# Patient Record
Sex: FEMALE | HISPANIC OR LATINO | Employment: OTHER | URBAN - METROPOLITAN AREA
[De-identification: names, ages, dates, MRNs, and addresses within clinical notes are randomized per-mention and may not be internally consistent; named-entity substitution may affect disease eponyms.]

---

## 2017-02-25 ENCOUNTER — APPOINTMENT (OUTPATIENT)
Dept: LAB | Facility: MEDICAL CENTER | Age: 47
End: 2017-02-25
Payer: COMMERCIAL

## 2017-03-18 ENCOUNTER — HOSPITAL ENCOUNTER (OUTPATIENT)
Dept: LAB | Facility: MEDICAL CENTER | Age: 47
End: 2017-03-18
Attending: INTERNAL MEDICINE
Payer: COMMERCIAL

## 2017-03-18 PROCEDURE — 83013 H PYLORI (C-13) BREATH: CPT

## 2017-03-21 LAB — UREA BREATH TEST QL: NEGATIVE

## 2017-04-26 ENCOUNTER — GYNECOLOGY VISIT (OUTPATIENT)
Dept: OBGYN | Facility: CLINIC | Age: 47
End: 2017-04-26
Payer: COMMERCIAL

## 2017-04-26 ENCOUNTER — HOSPITAL ENCOUNTER (OUTPATIENT)
Facility: MEDICAL CENTER | Age: 47
End: 2017-04-26
Attending: OBSTETRICS & GYNECOLOGY
Payer: COMMERCIAL

## 2017-04-26 VITALS
DIASTOLIC BLOOD PRESSURE: 70 MMHG | BODY MASS INDEX: 32.18 KG/M2 | HEIGHT: 67 IN | SYSTOLIC BLOOD PRESSURE: 122 MMHG | WEIGHT: 205 LBS

## 2017-04-26 DIAGNOSIS — Z11.51 SPECIAL SCREENING EXAMINATION FOR HUMAN PAPILLOMAVIRUS (HPV): ICD-10-CM

## 2017-04-26 DIAGNOSIS — Z12.4 SCREENING FOR CERVICAL CANCER: ICD-10-CM

## 2017-04-26 DIAGNOSIS — N95.1 PERIMENOPAUSAL VASOMOTOR SYMPTOMS: ICD-10-CM

## 2017-04-26 DIAGNOSIS — N91.5 OLIGOMENORRHEA: ICD-10-CM

## 2017-04-26 DIAGNOSIS — Z12.39 SCREENING FOR BREAST CANCER: ICD-10-CM

## 2017-04-26 PROCEDURE — 88175 CYTOPATH C/V AUTO FLUID REDO: CPT

## 2017-04-26 PROCEDURE — 99386 PREV VISIT NEW AGE 40-64: CPT | Performed by: OBSTETRICS & GYNECOLOGY

## 2017-04-26 PROCEDURE — 87624 HPV HI-RISK TYP POOLED RSLT: CPT

## 2017-04-26 NOTE — MR AVS SNAPSHOT
"        Sangeetha Oconnor Jaxon   2017 3:00 PM   Gynecology Visit   MRN: [id number]    Department:  [facility]   Dept Phone:  [phone]    Description:  Female : 1970   Provider:  Asael Suero M.D.           Reason for Visit     New Patient           Allergies as of 2017     No Known Allergies      You were diagnosed with     Screening for cervical cancer   [001908]       Special screening examination for human papillomavirus (HPV)   [V73.81.ICD-9-CM]       Oligomenorrhea   [787223]         Vital Signs     Blood Pressure Height Weight Body Mass Index Last Menstrual Period Breastfeeding?    122/70 mmHg 1.702 m (5' 7.01\") 92.987 kg (205 lb) 32.10 kg/m2 2017 No    Smoking Status                   Never Smoker            Basic Information     Date Of Birth Sex Race Ethnicity Preferred Language    1970 Female  or   Origin (Irish,Colombian,Gibraltarian,Reji, etc) English      Health Maintenance        Date Due Completion Dates    IMM DTaP/Tdap/Td Vaccine (1 - Tdap) 1991 ---    MAMMOGRAM 2012 ---    PAP SMEAR 3/13/2018 3/13/2015            Current Immunizations     No immunizations on file.      Below and/or attached are the medications your provider expects you to take. Review all of your home medications and newly ordered medications with your provider and/or pharmacist. Follow medication instructions as directed by your provider and/or pharmacist. Please keep your medication list with you and share with your provider. Update the information when medications are discontinued, doses are changed, or new medications (including over-the-counter products) are added; and carry medication information at all times in the event of emergency situations     Allergies:  No Known Allergies          Medications  Valid as of: 2017 -  3:34 PM    Generic Name Brand Name Tablet Size Instructions for use    Meclizine HCl (Tab) ANTIVERT 25 MG Take 1 Tab by " mouth 4 times a day as needed for Dizziness.        Naproxen (Tab) NAPROSYN 500 MG Take 1 Tab by mouth 2 times a day as needed.        Omega-3 Fatty Acids (Cap) fish oil 1000 MG Take 1,000 mg by mouth 3 times a day, with meals.        Omeprazole (CAPSULE DELAYED RELEASE) PRILOSEC 40 MG Take 1 Cap by mouth every day.        Omeprazole Magnesium (CAPSULE DELAYED RELEASE) Omeprazole Magnesium 20.6 (20 BASE) MG Take  by mouth.        Progesterone Micronized (Cap) PROMETRIUM 200 MG Take 1 Cap by mouth every day.        .                 Medicines prescribed today [facility] to:     [id number] [place] [place] [facility] [address]    [address] [place] [address]    Phone: [phone] Fax: [phone]    Open 24 Hours?: No      Medication refill instructions:       If your prescription bottle indicates you have medication refills left, it is not necessary to call your provider’s office. Please contact your pharmacy and they will refill your medication.    If your prescription bottle indicates you do not have any refills left, you may request refills at any time through one of the following ways: The online [employer] system (except Urgent Care), by calling your provider’s office, or by asking your pharmacy to contact your provider’s office with a refill request. Medication refills are processed only during regular business hours and may not be available until the next business day. Your provider may request additional information or to have a follow-up visit with you prior to refilling your medication.   *Please Note: Medication refills are assigned a new Rx number when refilled electronically. Your pharmacy may indicate that no refills were authorized even though a new prescription for the same medication is available at the pharmacy. Please request the medicine by name with the pharmacy before contacting your provider for a refill.        Your To Do List     Future Labs/Procedures Complete By Expires    ESTRADIOL  As directed  4/26/2018    MA-SCREEN MAMMO W/CAD-BILAT  As directed 4/26/2018    THINPREP PAP WITH HPV  As directed 4/26/2017         [email or web address] Access Code: [email or web address]  Expires: 5/26/2017  8:21 AM    Yaolan.com  A secure, online tool to manage your health information     [employer]’s Yaolan.com® is a secure, online tool that connects you to your personalized health information from the privacy of your home -- day or night - making it very easy for you to manage your healthcare. Once the activation process is completed, you can even access your medical information using the Yaolan.com rosalina, which is available for free in the Apple Rosalina store or Google Play store.     Yaolan.com provides the following levels of access (as shown below):   My Chart Features   [facility] Doctor [facility]  Urgent  Care Non-[facility]  Primary Care  Doctor   Email your healthcare team securely and privately 24/7 X X X    Manage appointments: schedule your next appointment; view details of past/upcoming appointments X      Request prescription refills. X      View recent personal medical records, including lab and immunizations X X X X   View health record, including health history, allergies, medications X X X X   Read reports about your outpatient visits, procedures, consult and ER notes X X X X   See your discharge summary, which is a recap of your hospital and/or ER visit that includes your diagnosis, lab results, and care plan. X X       How to register for Yaolan.com:  1. Go to  [email or web address].  2. Click on the Sign Up Now box, which takes you to the New Member Sign Up page. You will need to provide the following information:  a. Enter your Yaolan.com Access Code exactly as it appears at the top of this page. (You will not need to use this code after you’ve completed the sign-up process. If you do not sign up before the expiration date, you must request a new code.)   b. Enter your date of birth.   c. Enter your home email  address.   d. Click Submit, and follow the next screen’s instructions.  3. Create a [employer] ID. This will be your [employer] login ID and cannot be changed, so think of one that is secure and easy to remember.  4. Create a [employer] password. You can change your password at any time.  5. Enter your Password Reset Question and Answer. This can be used at a later time if you forget your password.   6. Enter your e-mail address. This allows you to receive e-mail notifications when new information is available in [employer].  7. Click Sign Up. You can now view your health information.    For assistance activating your [employer] account, call [phone]

## 2017-04-26 NOTE — PROGRESS NOTES
" Sangeetha Oconnor Lopez47 y.o.  SS female presents for Annual Well Woman Exam.    ROS: Patient is feeling well. No dyspnea or chest pain on exertion. No Abdominal pain, change in bowel habits, black or bloody stools. No urinary sx. GYN ROS:no breast pain or new or enlarging lumps on self exam, she complains of irregular cycles , but only once/monthly . Positive breast symptoms:  tenderness bilaterally with cycles . No neurological complaints.  History reviewed. No pertinent past medical history.Patient reports hot flashes and decreased libido   mammogram this month   Allergy:   Review of patient's allergies indicates no known allergies.    LMP:       Patient's last menstrual period was 2017. .     Menstrual History: menses irregular: monthly , but different dates , no cycle this month   Contraceptive Method:none      Objective : The patient appears well, alert and oriented x 3, in no acute distress.  Blood pressure 122/70, height 1.702 m (5' 7.01\"), weight 92.987 kg (205 lb), last menstrual period 2017, not currently breastfeeding.  HEENT Exam: EOMI, MARY, no adenopathy or thyromegaly.  Lungs: Clear to Auscultation and Percussion.  Cor: S1 and S2 normal, no murmurs, or rubs   Abdomen: Soft without tenderness, guarding mass or organomegaly.  Extremities: No edema, pulses equal  Neurological: Normal No focal signs  Breast Exam:Inspection negative. No nipple discharge or bleeding. No masses or nodularity palpable, negative findings: normal in size and symmetry, normal contour with no evidence of flattening or dimpling, skin normal, no palpable axillary lymphadenopathy, some nodularity   Pelvic: External genitalia,urethral meatus, urethra, bladder and vagina normal. Cervix, uterus and adnexa intact and normal.  Anus and perineum normal. Bimanual and rectovaginal without masses or tenderness.    Lab:No results found for this or any previous visit (from the past 336 hour(s)).    Assessment:  well " woman  Some menstrual irregularity   Perimenopausal symptoms     Plan:mammogram  pap smear  Need prometrium 200 mg x 10days   Check Hormones in luteal phase FSH/LH/Estradiol Progesterone   Contraception:none

## 2017-04-27 LAB
CYTOLOGY REG CYTOL: NORMAL
HPV HR 12 DNA CVX QL NAA+PROBE: NEGATIVE
HPV16 DNA SPEC QL NAA+PROBE: NEGATIVE
HPV18 DNA SPEC QL NAA+PROBE: NEGATIVE
SPECIMEN SOURCE: NORMAL

## 2017-05-26 ENCOUNTER — TELEPHONE (OUTPATIENT)
Dept: OBGYN | Facility: MEDICAL CENTER | Age: 47
End: 2017-05-26

## 2017-05-26 NOTE — TELEPHONE ENCOUNTER
Call from pt states that she finished her 10 tables of progesterone (PROMETRIUM) 200 MG capsule.  She started them  on 4/27/17.  She states that she has not had any bleeding or spotting, and states that she is concerned because she has pending labs. Would like to see what you recommend? Please advise.

## 2017-06-05 NOTE — TELEPHONE ENCOUNTER
Called pt, she states that she started her menses on 5/27/17 and will do her labs on 5/17/17. Pt has no other questions.

## 2017-06-17 ENCOUNTER — HOSPITAL ENCOUNTER (OUTPATIENT)
Dept: LAB | Facility: MEDICAL CENTER | Age: 47
End: 2017-06-17
Attending: OBSTETRICS & GYNECOLOGY
Payer: COMMERCIAL

## 2017-06-17 DIAGNOSIS — Z11.51 SPECIAL SCREENING EXAMINATION FOR HUMAN PAPILLOMAVIRUS (HPV): ICD-10-CM

## 2017-06-17 DIAGNOSIS — Z12.4 SCREENING FOR CERVICAL CANCER: ICD-10-CM

## 2017-06-17 DIAGNOSIS — N91.5 OLIGOMENORRHEA: ICD-10-CM

## 2017-06-17 LAB
ESTRADIOL SERPL-MCNC: 27 PG/ML
FSH SERPL-ACNC: 7.4 MIU/ML
LH SERPL-ACNC: 9 IU/L
PROGEST SERPL-MCNC: 0.32 NG/ML

## 2017-06-17 PROCEDURE — 84144 ASSAY OF PROGESTERONE: CPT

## 2017-06-17 PROCEDURE — 83002 ASSAY OF GONADOTROPIN (LH): CPT

## 2017-06-17 PROCEDURE — 83001 ASSAY OF GONADOTROPIN (FSH): CPT

## 2017-06-17 PROCEDURE — 82670 ASSAY OF TOTAL ESTRADIOL: CPT

## 2017-06-17 PROCEDURE — 36415 COLL VENOUS BLD VENIPUNCTURE: CPT

## 2017-09-29 ENCOUNTER — GYNECOLOGY VISIT (OUTPATIENT)
Dept: OBGYN | Facility: CLINIC | Age: 47
End: 2017-09-29
Payer: COMMERCIAL

## 2017-09-29 VITALS
SYSTOLIC BLOOD PRESSURE: 130 MMHG | BODY MASS INDEX: 32.8 KG/M2 | WEIGHT: 209 LBS | DIASTOLIC BLOOD PRESSURE: 80 MMHG | HEIGHT: 67 IN

## 2017-09-29 DIAGNOSIS — N93.9 ABNORMAL UTERINE BLEEDING (AUB): ICD-10-CM

## 2017-09-29 DIAGNOSIS — B37.9 CANDIDIASIS: ICD-10-CM

## 2017-09-29 PROCEDURE — 99214 OFFICE O/P EST MOD 30 MIN: CPT | Performed by: OBSTETRICS & GYNECOLOGY

## 2017-09-29 NOTE — PROGRESS NOTES
" 47 y.o.  female previously seen for : Chief Complaint:  abnormal uterine bleeding    Specialty Problems     None      . Patient now here in follow up. Patient placed on prometrium for withdrawal . Patient last seen 2017. Took meds only in April and had may cycle , then stopped and no cycle until bleed : 2017     Patient's last menstrual period was 2017.  : 2017      Subjective: Abdominal Pain: negative    Vaginal Bleedingnegative  Menstrual Cycle: normal: negative  Dysmenorrhea:positive:   Dyspareunia:negative  Urinary Symptoms: negative   Vaginal Discharge:{No          Current Outpatient Prescriptions:   •  progesterone (PROMETRIUM) 200 MG capsule, Take 1 Cap by mouth every day., Disp: 10 Cap, Rfl: 11  •  Omega-3 Fatty Acids (FISH OIL) 1000 MG Cap capsule, Take 1,000 mg by mouth 3 times a day, with meals., Disp: , Rfl:   •  Omeprazole Magnesium 20.6 (20 BASE) MG CAPSULE DELAYED RELEASE, Take  by mouth., Disp: , Rfl:   •  meclizine (ANTIVERT) 25 MG Tab, Take 1 Tab by mouth 4 times a day as needed for Dizziness., Disp: 20 Tab, Rfl: 0  •  naproxen (NAPROSYN) 500 MG Tab, Take 1 Tab by mouth 2 times a day as needed., Disp: 30 Tab, Rfl: 0  •  omeprazole (PRILOSEC) 40 MG delayed-release capsule, Take 1 Cap by mouth every day., Disp: 30 Cap, Rfl: 2  ROS: no change in ROS since visit of : Visit date not found.  :No results found for this or any previous visit (from the past 336 hour(s)).    Vitals:    17 1538   BP: 130/80   Weight: 94.8 kg (209 lb)   Height: 1.702 m (5' 7\")     No past medical history on file.  PGYN: None  Social History     Social History   • Marital status:      Spouse name: N/A   • Number of children: N/A   • Years of education: N/A     Occupational History   • Not on file.     Social History Main Topics   • Smoking status: Never Smoker   • Smokeless tobacco: Not on file   • Alcohol use No   • Drug use: No   • Sexual activity: No     Other Topics Concern   • Not on " file     Social History Narrative   • No narrative on file     Family History   Problem Relation Age of Onset   • Diabetes     • Hypertension       Past Surgical History:   Procedure Laterality Date   • PELVISCOPY  7/15/2011    Performed by JERSON COYNE at SURGERY [facility] DAY [facility]   • OVARIAN CYSTECTOMY  7/15/2011    Performed by JERSON COYNE at SURGERY [facility] DAY [facility]         Exam:   General : Awake, alert and oriented x 3  Abdominal : no masses, soft, non-distended no rebound or guarding  Pelvic :  external genitalia normal, Bartholin's glands, urethra, [place]'s glands negative, vaginal mucosa normal;  uterus retroflexed  Pelvic Support system : normal      Ass:     Spent 25 minutes with the patient ; Face to Face, with >50% of this time spent in counseling and coordination of care, surrounding the above mentioned issues as well as:   Need cyclic withdrawal . Patient given easier way to remember  P. Prometrium 200 mg x 10days : Days 1-10 , each month   Start 10/1/2017    Follow up : Return visit in 3 month(s)

## 2017-12-20 ENCOUNTER — GYNECOLOGY VISIT (OUTPATIENT)
Dept: OBGYN | Facility: CLINIC | Age: 47
End: 2017-12-20
Payer: COMMERCIAL

## 2017-12-20 VITALS
BODY MASS INDEX: 33.27 KG/M2 | SYSTOLIC BLOOD PRESSURE: 124 MMHG | DIASTOLIC BLOOD PRESSURE: 80 MMHG | WEIGHT: 212 LBS | HEIGHT: 67 IN

## 2017-12-20 DIAGNOSIS — N93.8 DUB (DYSFUNCTIONAL UTERINE BLEEDING): ICD-10-CM

## 2017-12-20 DIAGNOSIS — Z01.419 WELL WOMAN EXAM WITH ROUTINE GYNECOLOGICAL EXAM: ICD-10-CM

## 2017-12-20 PROCEDURE — 99213 OFFICE O/P EST LOW 20 MIN: CPT | Performed by: OBSTETRICS & GYNECOLOGY

## 2017-12-20 RX ORDER — MEDROXYPROGESTERONE ACETATE 10 MG/1
10 TABLET ORAL DAILY
Qty: 10 TAB | Refills: 6 | Status: SHIPPED | OUTPATIENT
Start: 2017-12-20 | End: 2023-07-31

## 2017-12-21 NOTE — PROGRESS NOTES
" 47 y.o.  female previously seen for : Chief Complaint:  abnormal uterine bleeding    Specialty Problems     None      . Patient now here in follow up. Patient has been taking monthly prometrium for withdrawal . She has delayed and scant cycles after last tab    Patient's last menstrual period was 2017.      Subjective: Abdominal Pain: negative    Vaginal Bleedingnegative  Menstrual Cycle: normal: positive  Dysmenorrhea:negative:   Dyspareunia:negative  Urinary Symptoms: negative   Vaginal Discharge:{No          Current Outpatient Prescriptions:   •  medroxyPROGESTERone (PROVERA) 10 MG Tab, Take 1 Tab by mouth every day., Disp: 10 Tab, Rfl: 6  •  progesterone (PROMETRIUM) 200 MG capsule, Take 1 Cap by mouth every day., Disp: 30 Cap, Rfl: 11  •  MICONAZOLE NITRATE VAGINAL (MONISTAT 3) 4 % Cream, 1 applicatorful per vagina HS x 3 days, Disp: 1 Tube, Rfl: 2  •  progesterone (PROMETRIUM) 200 MG capsule, Take 1 Cap by mouth every day., Disp: 10 Cap, Rfl: 11  •  Omega-3 Fatty Acids (FISH OIL) 1000 MG Cap capsule, Take 1,000 mg by mouth 3 times a day, with meals., Disp: , Rfl:   •  Omeprazole Magnesium 20.6 (20 BASE) MG CAPSULE DELAYED RELEASE, Take  by mouth., Disp: , Rfl:   •  meclizine (ANTIVERT) 25 MG Tab, Take 1 Tab by mouth 4 times a day as needed for Dizziness., Disp: 20 Tab, Rfl: 0  •  naproxen (NAPROSYN) 500 MG Tab, Take 1 Tab by mouth 2 times a day as needed., Disp: 30 Tab, Rfl: 0  •  omeprazole (PRILOSEC) 40 MG delayed-release capsule, Take 1 Cap by mouth every day., Disp: 30 Cap, Rfl: 2  ROS: no change in ROS since visit of : Visit date not found.  :No results found for this or any previous visit (from the past 336 hour(s)).    Vitals:    17 1527   BP: 124/80   Weight: 96.2 kg (212 lb)   Height: 1.702 m (5' 7\")     No past medical history on file.  PGYN: anovulatory cycles   Social History     Social History   • Marital status:      Spouse name: N/A   • Number of children: N/A   • " Years of education: N/A     Occupational History   • Not on file.     Social History Main Topics   • Smoking status: Never Smoker   • Smokeless tobacco: Not on file   • Alcohol use No   • Drug use: No   • Sexual activity: No     Other Topics Concern   • Not on file     Social History Narrative   • No narrative on file     Family History   Problem Relation Age of Onset   • Diabetes     • Hypertension       Past Surgical History:   Procedure Laterality Date   • PELVISCOPY  7/15/2011    Performed by JERSON COYNE at [facility]   • OVARIAN CYSTECTOMY  7/15/2011    Performed by JERSON COYNE at [facility]ERY [facility] DAY [facility]         Exam: deferred      Ass: irregular cycles           Positive , but weak response to prometrium     Spent 15 minutes minutes with the patient ; Face to Face, with >50% of this time spent in counseling and coordination of care, surrounding the above mentioned issues as well as    P. Switch to provera 10 mg x 10 days : monthly day 1-10     Follow up : Return visit in 4 month(s)

## 2018-04-14 ENCOUNTER — HOSPITAL ENCOUNTER (OUTPATIENT)
Dept: LAB | Facility: MEDICAL CENTER | Age: 48
End: 2018-04-14
Attending: OBSTETRICS & GYNECOLOGY
Payer: COMMERCIAL

## 2018-04-14 LAB
ALBUMIN SERPL BCP-MCNC: 4.5 G/DL (ref 3.2–4.9)
ALBUMIN/GLOB SERPL: 1.5 G/DL
ALP SERPL-CCNC: 94 U/L (ref 30–99)
ALT SERPL-CCNC: 23 U/L (ref 2–50)
ANION GAP SERPL CALC-SCNC: 8 MMOL/L (ref 0–11.9)
AST SERPL-CCNC: 21 U/L (ref 12–45)
BILIRUB SERPL-MCNC: 0.6 MG/DL (ref 0.1–1.5)
BUN SERPL-MCNC: 17 MG/DL (ref 8–22)
CALCIUM SERPL-MCNC: 9.1 MG/DL (ref 8.5–10.5)
CHLORIDE SERPL-SCNC: 105 MMOL/L (ref 96–112)
CHOLEST SERPL-MCNC: 140 MG/DL (ref 100–199)
CO2 SERPL-SCNC: 25 MMOL/L (ref 20–33)
CREAT SERPL-MCNC: 0.55 MG/DL (ref 0.5–1.4)
GLOBULIN SER CALC-MCNC: 3 G/DL (ref 1.9–3.5)
GLUCOSE SERPL-MCNC: 108 MG/DL (ref 65–99)
HDLC SERPL-MCNC: 36 MG/DL
LDLC SERPL CALC-MCNC: 88 MG/DL
POTASSIUM SERPL-SCNC: 3.9 MMOL/L (ref 3.6–5.5)
PROT SERPL-MCNC: 7.5 G/DL (ref 6–8.2)
SODIUM SERPL-SCNC: 138 MMOL/L (ref 135–145)
TRIGL SERPL-MCNC: 78 MG/DL (ref 0–149)

## 2018-04-14 PROCEDURE — 80061 LIPID PANEL: CPT

## 2018-04-14 PROCEDURE — 80053 COMPREHEN METABOLIC PANEL: CPT

## 2018-04-14 PROCEDURE — 36415 COLL VENOUS BLD VENIPUNCTURE: CPT

## 2018-04-24 ENCOUNTER — HOSPITAL ENCOUNTER (OUTPATIENT)
Facility: MEDICAL CENTER | Age: 48
End: 2018-04-24
Attending: OBSTETRICS & GYNECOLOGY

## 2018-04-24 ENCOUNTER — GYNECOLOGY VISIT (OUTPATIENT)
Dept: OBGYN | Facility: CLINIC | Age: 48
End: 2018-04-24
Payer: COMMERCIAL

## 2018-04-24 VITALS
HEIGHT: 67 IN | DIASTOLIC BLOOD PRESSURE: 74 MMHG | SYSTOLIC BLOOD PRESSURE: 118 MMHG | WEIGHT: 213 LBS | BODY MASS INDEX: 33.43 KG/M2

## 2018-04-24 DIAGNOSIS — Z12.4 SCREENING FOR CERVICAL CANCER: ICD-10-CM

## 2018-04-24 DIAGNOSIS — Z11.51 SPECIAL SCREENING EXAMINATION FOR HUMAN PAPILLOMAVIRUS (HPV): ICD-10-CM

## 2018-04-24 DIAGNOSIS — N92.6 IRREGULAR PERIODS/MENSTRUAL CYCLES: ICD-10-CM

## 2018-04-24 DIAGNOSIS — R23.2 HOT FLASHES: ICD-10-CM

## 2018-04-24 DIAGNOSIS — Z01.419 WELL WOMAN EXAM: ICD-10-CM

## 2018-04-24 DIAGNOSIS — Z12.31 ENCOUNTER FOR SCREENING MAMMOGRAM FOR BREAST CANCER: ICD-10-CM

## 2018-04-24 PROCEDURE — 99396 PREV VISIT EST AGE 40-64: CPT | Performed by: OBSTETRICS & GYNECOLOGY

## 2018-04-24 PROCEDURE — 87624 HPV HI-RISK TYP POOLED RSLT: CPT

## 2018-04-24 PROCEDURE — 88175 CYTOPATH C/V AUTO FLUID REDO: CPT

## 2018-04-24 NOTE — PROGRESS NOTES
" Sangeetha Oconnor Lopez48 y.o.  female presents for Annual Well Woman Exam.    ROS: Patient is feeling well. No dyspnea or chest pain on exertion. No Abdominal pain, change in bowel habits, black or bloody stools. No urinary sx. GYN ROS:no breast pain or new or enlarging lumps on self exam, she complains of hot flashes , vaginal dryness, and negative response to provera since 2018 . Denies breast tenderness, mass, discharge, changes in size or contour, or abnormal cyclic discomfort. No neurological complaints.  No past medical history on file.  perimenopausal   Allergy:   Patient has no known allergies.    LMP:       No LMP recorded. .     Menstrual History: menses irregular: 2018 , : previously on monthly Provera , with positive response   Contraceptive Method:none      Objective : The patient appears well, alert and oriented x 3, in no acute distress.  Blood pressure 118/74, height 1.702 m (5' 7\"), weight 96.6 kg (213 lb), not currently breastfeeding.  HEENT Exam: EOMI, MARY, no adenopathy or thyromegaly.  Lungs: Clear to Auscultation and Percussion.  Cor: S1 and S2 normal, no murmurs, or rubs   Abdomen: Soft without tenderness, guarding mass or organomegaly.  Extremities: No edema, pulses equal  Neurological: Normal No focal signs  Breast Exam:Inspection negative. No nipple discharge or bleeding. No masses or nodularity palpable, negative findings: normal in size and symmetry, normal contour with no evidence of flattening or dimpling, skin normal, nipples everted without rashes or discharge, palpation negative for masses or nodules  Pelvic: External genitalia,urethral meatus, urethra, bladder normal.  vagiasn darken hue , smooth walls   Cervix, uterus and adnexa intact and normal, .  Anus and perineum normal. Bimanual and rectovaginal without masses or tenderness.    Lab:  Recent Results (from the past 336 hour(s))   COMP METABOLIC PANEL    Collection Time: 18  9:41 AM   Result Value Ref Range "    Sodium 138 135 - 145 mmol/L    Potassium 3.9 3.6 - 5.5 mmol/L    Chloride 105 96 - 112 mmol/L    Co2 25 20 - 33 mmol/L    Anion Gap 8.0 0.0 - 11.9    Glucose 108 (H) 65 - 99 mg/dL    Bun 17 8 - 22 mg/dL    Creatinine 0.55 0.50 - 1.40 mg/dL    Calcium 9.1 8.5 - 10.5 mg/dL    AST(SGOT) 21 12 - 45 U/L    ALT(SGPT) 23 2 - 50 U/L    Alkaline Phosphatase 94 30 - 99 U/L    Total Bilirubin 0.6 0.1 - 1.5 mg/dL    Albumin 4.5 3.2 - 4.9 g/dL    Total Protein 7.5 6.0 - 8.2 g/dL    Globulin 3.0 1.9 - 3.5 g/dL    A-G Ratio 1.5 g/dL   LIPID PROFILE    Collection Time: 04/14/18  9:41 AM   Result Value Ref Range    Cholesterol,Tot 140 100 - 199 mg/dL    Triglycerides 78 0 - 149 mg/dL    HDL 36 (A) >=40 mg/dL    LDL 88 <100 mg/dL   ESTIMATED GFR    Collection Time: 04/14/18  9:41 AM   Result Value Ref Range    GFR If African American >60 >60 mL/min/1.73 m 2    GFR If Non African American >60 >60 mL/min/1.73 m 2       Assessment:  well woman  Menopausal clinically , negative provera challenge   Hot flashes   vaginal dryness    Plan:mammogram  pap smear  return annually or prn  Self Breast Exams  Contraception:none   FSH/LH/ estradiol   RTC for HRT

## 2018-05-14 ENCOUNTER — TELEPHONE (OUTPATIENT)
Dept: OBGYN | Facility: CLINIC | Age: 48
End: 2018-05-14

## 2018-05-14 NOTE — TELEPHONE ENCOUNTER
Called pt and informed her the mammogram order was faxed to the [facility].for them to cover it for the patient.

## 2018-05-14 NOTE — TELEPHONE ENCOUNTER
----- Message from Roxana Jolly sent at 5/14/2018  4:25 PM PDT -----  Regarding: mammo order  Contact: [phone]  Patient called and has a mammo on 5/24/18. Patient states [employer] told her that they need a lab order faxed over to them in order for them to cover it. Thank you.

## 2018-05-18 ENCOUNTER — TELEPHONE (OUTPATIENT)
Dept: OBGYN | Facility: CLINIC | Age: 48
End: 2018-05-18

## 2018-05-24 ENCOUNTER — HOSPITAL ENCOUNTER (OUTPATIENT)
Dept: RADIOLOGY | Facility: MEDICAL CENTER | Age: 48
End: 2018-05-24
Attending: OBSTETRICS & GYNECOLOGY
Payer: COMMERCIAL

## 2018-05-24 DIAGNOSIS — Z01.419 WELL WOMAN EXAM: ICD-10-CM

## 2018-05-24 DIAGNOSIS — Z12.4 SCREENING FOR CERVICAL CANCER: ICD-10-CM

## 2018-05-24 DIAGNOSIS — Z11.51 SPECIAL SCREENING EXAMINATION FOR HUMAN PAPILLOMAVIRUS (HPV): ICD-10-CM

## 2018-05-24 DIAGNOSIS — Z12.31 ENCOUNTER FOR SCREENING MAMMOGRAM FOR BREAST CANCER: ICD-10-CM

## 2018-05-24 PROCEDURE — 77067 SCR MAMMO BI INCL CAD: CPT

## 2018-08-25 ENCOUNTER — HOSPITAL ENCOUNTER (OUTPATIENT)
Dept: LAB | Facility: MEDICAL CENTER | Age: 48
End: 2018-08-25
Attending: OBSTETRICS & GYNECOLOGY
Payer: COMMERCIAL

## 2018-08-25 LAB
FSH SERPL-ACNC: 7.9 MIU/ML
LH SERPL-ACNC: 6 IU/L

## 2018-08-25 PROCEDURE — 36415 COLL VENOUS BLD VENIPUNCTURE: CPT

## 2018-08-25 PROCEDURE — 83001 ASSAY OF GONADOTROPIN (FSH): CPT

## 2018-08-25 PROCEDURE — 83002 ASSAY OF GONADOTROPIN (LH): CPT

## 2018-09-05 ENCOUNTER — GYNECOLOGY VISIT (OUTPATIENT)
Dept: OBGYN | Facility: CLINIC | Age: 48
End: 2018-09-05
Payer: COMMERCIAL

## 2018-09-05 VITALS — DIASTOLIC BLOOD PRESSURE: 70 MMHG | WEIGHT: 212 LBS | SYSTOLIC BLOOD PRESSURE: 120 MMHG | BODY MASS INDEX: 33.2 KG/M2

## 2018-09-05 DIAGNOSIS — R55 VASOMOTOR INSTABILITY: ICD-10-CM

## 2018-09-05 DIAGNOSIS — G47.01 INSOMNIA DUE TO MEDICAL CONDITION: ICD-10-CM

## 2018-09-05 PROCEDURE — 99213 OFFICE O/P EST LOW 20 MIN: CPT | Performed by: OBSTETRICS & GYNECOLOGY

## 2018-09-05 RX ORDER — ZOLPIDEM TARTRATE 5 MG/1
5 TABLET ORAL NIGHTLY PRN
Qty: 30 TAB | Refills: 0 | Status: SHIPPED | OUTPATIENT
Start: 2018-09-05 | End: 2018-10-05

## 2018-09-05 NOTE — PROGRESS NOTES
48 y.o.  female previously seen for : Chief Complaint:  Irregular bleeding , and now with left mastodynia     Specialty Problems     None      . Patient now here in follow up. Had positive spontaneous cycle , and some associated left breast pain .   Patient's last menstrual period was 2018.      Subjective: Abdominal Pain: negative    Vaginal Bleedingpositive  Menstrual Cycle: normal: positive  Dysmenorrhea:negative:   Dyspareunia:positive  Urinary Symptoms: negative   Vaginal Discharge:{No          Current Outpatient Prescriptions:   •  medroxyPROGESTERone (PROVERA) 10 MG Tab, Take 1 Tab by mouth every day., Disp: 10 Tab, Rfl: 6  •  progesterone (PROMETRIUM) 200 MG capsule, Take 1 Cap by mouth every day., Disp: 30 Cap, Rfl: 11  •  MICONAZOLE NITRATE VAGINAL (MONISTAT 3) 4 % Cream, 1 applicatorful per vagina HS x 3 days, Disp: 1 Tube, Rfl: 2  •  progesterone (PROMETRIUM) 200 MG capsule, Take 1 Cap by mouth every day., Disp: 10 Cap, Rfl: 11  •  Omega-3 Fatty Acids (FISH OIL) 1000 MG Cap capsule, Take 1,000 mg by mouth 3 times a day, with meals., Disp: , Rfl:   •  Omeprazole Magnesium 20.6 (20 BASE) MG CAPSULE DELAYED RELEASE, Take  by mouth., Disp: , Rfl:   •  meclizine (ANTIVERT) 25 MG Tab, Take 1 Tab by mouth 4 times a day as needed for Dizziness., Disp: 20 Tab, Rfl: 0  •  naproxen (NAPROSYN) 500 MG Tab, Take 1 Tab by mouth 2 times a day as needed., Disp: 30 Tab, Rfl: 0  •  omeprazole (PRILOSEC) 40 MG delayed-release capsule, Take 1 Cap by mouth every day., Disp: 30 Cap, Rfl: 2  ROS: no change in ROS since visit of : 2018.  :  Recent Results (from the past 336 hour(s))   FSH/LH    Collection Time: 18 11:47 AM   Result Value Ref Range    Follicle Stimulating Hormone 7.9 mIU/mL    Luteinizing Hormone 6.0 IU/L       Vitals:    18 0954   BP: 120/70   Weight: 96.2 kg (212 lb)     No past medical history on file.  PGYN: perimenopasual   Social History     Social History   • Marital status:       Spouse name: N/A   • Number of children: N/A   • Years of education: N/A     Occupational History   • Not on file.     Social History Main Topics   • Smoking status: Never Smoker   • Smokeless tobacco: Not on file   • Alcohol use No   • Drug use: No   • Sexual activity: No     Other Topics Concern   • Not on file     Social History Narrative   • No narrative on file     Family History   Problem Relation Age of Onset   • Diabetes Unknown    • Hypertension Unknown      Past Surgical History:   Procedure Laterality Date   • PELVISCOPY  7/15/2011    Performed by JERSON COYNE at [facility]   • OVARIAN CYSTECTOMY  7/15/2011    Performed by JERSON COYNE at [facility]     Results for ISAIAH RICHARDSON (MRN [id number]) as of 9/5/2018 12:58   Ref. Range 8/25/2018 11:47   Follicle Stimulating Hormone Latest Units: mIU/mL 7.9   Luteinizing Hormone Latest Units: IU/L 6.0       Exam:   General : Awake, alert and oriented x 3, Cranial nerves II-XII grossly intact  Abdominal : no masses, nontender, non-distended   Breast : breasts symmetric, no dominant or suspicious mass, no skin or nipple changes and no axillary adenopathy      Ass:     Spent 15 minutes minutes with the patient ; Face to Face, with >50% of this time spent in counseling and coordination of care, surrounding the above mentioned issues as well as:discussed risks of starting HRT, before completely menopausal     ZARA BRODERICK , # 30 only   Continue BSE     Follow up : Return visit in 6 month(s)

## 2019-04-09 ENCOUNTER — TELEPHONE (OUTPATIENT)
Dept: OBGYN | Facility: CLINIC | Age: 49
End: 2019-04-09

## 2019-04-09 NOTE — TELEPHONE ENCOUNTER
Pt LM on  requesting a call back.   Called pt, wanted to confirm f/u appt since it was r/s. Confirmed appt with pt and she had no other questions

## 2019-04-16 ENCOUNTER — GYNECOLOGY VISIT (OUTPATIENT)
Dept: OBGYN | Facility: CLINIC | Age: 49
End: 2019-04-16
Payer: COMMERCIAL

## 2019-04-16 ENCOUNTER — HOSPITAL ENCOUNTER (OUTPATIENT)
Facility: MEDICAL CENTER | Age: 49
End: 2019-04-16
Attending: OBSTETRICS & GYNECOLOGY
Payer: COMMERCIAL

## 2019-04-16 VITALS — SYSTOLIC BLOOD PRESSURE: 130 MMHG | DIASTOLIC BLOOD PRESSURE: 78 MMHG | BODY MASS INDEX: 32.89 KG/M2 | WEIGHT: 210 LBS

## 2019-04-16 DIAGNOSIS — Z12.4 CERVICAL CANCER SCREENING: ICD-10-CM

## 2019-04-16 PROCEDURE — 88175 CYTOPATH C/V AUTO FLUID REDO: CPT

## 2019-04-16 PROCEDURE — 99213 OFFICE O/P EST LOW 20 MIN: CPT | Performed by: OBSTETRICS & GYNECOLOGY

## 2019-04-16 PROCEDURE — 87624 HPV HI-RISK TYP POOLED RSLT: CPT

## 2019-04-17 DIAGNOSIS — Z12.4 CERVICAL CANCER SCREENING: ICD-10-CM

## 2019-04-19 ENCOUNTER — TELEPHONE (OUTPATIENT)
Dept: OBGYN | Facility: CLINIC | Age: 49
End: 2019-04-19

## 2019-04-19 NOTE — TELEPHONE ENCOUNTER
Pt called requesting to know the date of her last mammogram.  Info provided, has not further questions.

## 2019-04-27 ENCOUNTER — HOSPITAL ENCOUNTER (OUTPATIENT)
Dept: LAB | Facility: MEDICAL CENTER | Age: 49
End: 2019-04-27
Attending: INTERNAL MEDICINE
Payer: COMMERCIAL

## 2019-04-27 LAB
ALBUMIN SERPL BCP-MCNC: 4.2 G/DL (ref 3.2–4.9)
ALBUMIN/GLOB SERPL: 1.6 G/DL
ALP SERPL-CCNC: 88 U/L (ref 30–99)
ALT SERPL-CCNC: 17 U/L (ref 2–50)
ANION GAP SERPL CALC-SCNC: 9 MMOL/L (ref 0–11.9)
AST SERPL-CCNC: 16 U/L (ref 12–45)
BASOPHILS # BLD AUTO: 0.5 % (ref 0–1.8)
BASOPHILS # BLD: 0.03 K/UL (ref 0–0.12)
BILIRUB SERPL-MCNC: 0.6 MG/DL (ref 0.1–1.5)
BUN SERPL-MCNC: 15 MG/DL (ref 8–22)
CALCIUM SERPL-MCNC: 9 MG/DL (ref 8.5–10.5)
CHLORIDE SERPL-SCNC: 107 MMOL/L (ref 96–112)
CHOLEST SERPL-MCNC: 138 MG/DL (ref 100–199)
CO2 SERPL-SCNC: 24 MMOL/L (ref 20–33)
CREAT SERPL-MCNC: 0.51 MG/DL (ref 0.5–1.4)
EOSINOPHIL # BLD AUTO: 0.11 K/UL (ref 0–0.51)
EOSINOPHIL NFR BLD: 1.9 % (ref 0–6.9)
ERYTHROCYTE [DISTWIDTH] IN BLOOD BY AUTOMATED COUNT: 43.8 FL (ref 35.9–50)
EST. AVERAGE GLUCOSE BLD GHB EST-MCNC: 126 MG/DL
FASTING STATUS PATIENT QL REPORTED: NORMAL
GLOBULIN SER CALC-MCNC: 2.7 G/DL (ref 1.9–3.5)
GLUCOSE SERPL-MCNC: 103 MG/DL (ref 65–99)
HBA1C MFR BLD: 6 % (ref 0–5.6)
HCT VFR BLD AUTO: 43.7 % (ref 37–47)
HDLC SERPL-MCNC: 39 MG/DL
HGB BLD-MCNC: 14.3 G/DL (ref 12–16)
IMM GRANULOCYTES # BLD AUTO: 0.02 K/UL (ref 0–0.11)
IMM GRANULOCYTES NFR BLD AUTO: 0.3 % (ref 0–0.9)
LDLC SERPL CALC-MCNC: 81 MG/DL
LYMPHOCYTES # BLD AUTO: 1.97 K/UL (ref 1–4.8)
LYMPHOCYTES NFR BLD: 33.3 % (ref 22–41)
MCH RBC QN AUTO: 30.6 PG (ref 27–33)
MCHC RBC AUTO-ENTMCNC: 32.7 G/DL (ref 33.6–35)
MCV RBC AUTO: 93.6 FL (ref 81.4–97.8)
MONOCYTES # BLD AUTO: 0.49 K/UL (ref 0–0.85)
MONOCYTES NFR BLD AUTO: 8.3 % (ref 0–13.4)
NEUTROPHILS # BLD AUTO: 3.3 K/UL (ref 2–7.15)
NEUTROPHILS NFR BLD: 55.7 % (ref 44–72)
NRBC # BLD AUTO: 0 K/UL
NRBC BLD-RTO: 0 /100 WBC
PLATELET # BLD AUTO: 233 K/UL (ref 164–446)
PMV BLD AUTO: 10.3 FL (ref 9–12.9)
POTASSIUM SERPL-SCNC: 3.9 MMOL/L (ref 3.6–5.5)
PROT SERPL-MCNC: 6.9 G/DL (ref 6–8.2)
RBC # BLD AUTO: 4.67 M/UL (ref 4.2–5.4)
SODIUM SERPL-SCNC: 140 MMOL/L (ref 135–145)
TRIGL SERPL-MCNC: 92 MG/DL (ref 0–149)
WBC # BLD AUTO: 5.9 K/UL (ref 4.8–10.8)

## 2019-04-27 PROCEDURE — 83036 HEMOGLOBIN GLYCOSYLATED A1C: CPT

## 2019-04-27 PROCEDURE — 85025 COMPLETE CBC W/AUTO DIFF WBC: CPT

## 2019-04-27 PROCEDURE — 80053 COMPREHEN METABOLIC PANEL: CPT

## 2019-04-27 PROCEDURE — 80061 LIPID PANEL: CPT

## 2019-04-27 PROCEDURE — 36415 COLL VENOUS BLD VENIPUNCTURE: CPT

## 2019-08-09 ENCOUNTER — GYNECOLOGY VISIT (OUTPATIENT)
Dept: OBGYN | Facility: CLINIC | Age: 49
End: 2019-08-09
Payer: COMMERCIAL

## 2019-08-09 ENCOUNTER — TELEPHONE (OUTPATIENT)
Dept: OBGYN | Facility: CLINIC | Age: 49
End: 2019-08-09

## 2019-08-09 VITALS — WEIGHT: 199 LBS | DIASTOLIC BLOOD PRESSURE: 74 MMHG | BODY MASS INDEX: 31.17 KG/M2 | SYSTOLIC BLOOD PRESSURE: 114 MMHG

## 2019-08-09 DIAGNOSIS — N75.0 BARTHOLIN CYST: ICD-10-CM

## 2019-08-09 DIAGNOSIS — Z12.31 ENCOUNTER FOR SCREENING MAMMOGRAM FOR BREAST CANCER: ICD-10-CM

## 2019-08-09 PROCEDURE — 99214 OFFICE O/P EST MOD 30 MIN: CPT | Performed by: OBSTETRICS & GYNECOLOGY

## 2019-08-09 RX ORDER — AMOXICILLIN AND CLAVULANATE POTASSIUM 875; 125 MG/1; MG/1
1 TABLET, FILM COATED ORAL 2 TIMES DAILY
Qty: 14 TAB | Refills: 0 | Status: SHIPPED | OUTPATIENT
Start: 2019-08-09 | End: 2023-07-31

## 2019-08-10 NOTE — PROGRESS NOTES
.   49 y.o.  female previously seen for : Chief Complaint:  Right Vulvar Bump     Specialty Problems     None      . Patient now here in follow up. Patient reports today , noticed a bump, in vagina today , without pain , or fever .thius is first time that this has occurred     No LMP recorded (lmp unknown).      Subjective: Abdominal Pain: negative    Vaginal Bleedingnegative  Menstrual Cycle: normal: positive  Dysmenorrhea:negative:   Dyspareunia:negative  Urinary Symptoms: negative   Vaginal Discharge:{No          Current Outpatient Medications:   •  amoxicillin-clavulanate (AUGMENTIN) 875-125 MG Tab, Take 1 Tab by mouth 2 times a day., Disp: 14 Tab, Rfl: 0  •  medroxyPROGESTERone (PROVERA) 10 MG Tab, Take 1 Tab by mouth every day. (Patient not taking: Reported on 2019), Disp: 10 Tab, Rfl: 6  •  progesterone (PROMETRIUM) 200 MG capsule, Take 1 Cap by mouth every day. (Patient not taking: Reported on 2019), Disp: 30 Cap, Rfl: 11  •  MICONAZOLE NITRATE VAGINAL (MONISTAT 3) 4 % Cream, 1 applicatorful per vagina HS x 3 days (Patient not taking: Reported on 2019), Disp: 1 Tube, Rfl: 2  •  progesterone (PROMETRIUM) 200 MG capsule, Take 1 Cap by mouth every day. (Patient not taking: Reported on 2019), Disp: 10 Cap, Rfl: 11  •  Omega-3 Fatty Acids (FISH OIL) 1000 MG Cap capsule, Take 1,000 mg by mouth 3 times a day, with meals., Disp: , Rfl:   •  Omeprazole Magnesium 20.6 (20 BASE) MG CAPSULE DELAYED RELEASE, Take  by mouth., Disp: , Rfl:   •  meclizine (ANTIVERT) 25 MG Tab, Take 1 Tab by mouth 4 times a day as needed for Dizziness. (Patient not taking: Reported on 2019), Disp: 20 Tab, Rfl: 0  •  naproxen (NAPROSYN) 500 MG Tab, Take 1 Tab by mouth 2 times a day as needed. (Patient not taking: Reported on 2019), Disp: 30 Tab, Rfl: 0  •  omeprazole (PRILOSEC) 40 MG delayed-release capsule, Take 1 Cap by mouth every day. (Patient not taking: Reported on 2019), Disp: 30 Cap, Rfl:  2  ROS: no change in ROS since visit of : 4/19/2019.  :No results found for this or any previous visit (from the past 336 hour(s)).    Vitals:    08/09/19 1630   BP: 114/74   Weight: 90.3 kg (199 lb)     No past medical history on file.  PGYN: None  Social History     Socioeconomic History   • Marital status:      Spouse name: Not on file   • Number of children: Not on file   • Years of education: Not on file   • Highest education level: Not on file   Occupational History   • Not on file   Social Needs   • Financial resource strain: Not on file   • Food insecurity:     Worry: Not on file     Inability: Not on file   • Transportation needs:     Medical: Not on file     Non-medical: Not on file   Tobacco Use   • Smoking status: Never Smoker   • Smokeless tobacco: Never Used   Substance and Sexual Activity   • Alcohol use: No   • Drug use: No   • Sexual activity: Never   Lifestyle   • Physical activity:     Days per week: Not on file     Minutes per session: Not on file   • Stress: Not on file   Relationships   • Social connections:     Talks on phone: Not on file     Gets together: Not on file     Attends [employer] service: Not on file     Active member of club or organization: Not on file     Attends meetings of clubs or organizations: Not on file     Relationship status: Not on file   • Intimate partner violence:     Fear of current or ex partner: Not on file     Emotionally abused: Not on file     Physically abused: Not on file     Forced sexual activity: Not on file   Other Topics Concern   • Not on file   Social History Narrative   • Not on file     Family History   Problem Relation Age of Onset   • Diabetes Unknown    • Hypertension Unknown      Past Surgical History:   Procedure Laterality Date   • PELVISCOPY  7/15/2011    Performed by JERSON COYNE at [facility]   • OVARIAN CYSTECTOMY  7/15/2011    Performed by JERSON COYNE at [facility]         Exam:    General : Awake, alert and oriented x 3, Cranial nerves II-XII grossly intact  Abdominal : nontender, soft, non-distended no rebound or guarding  Pelvic :  external genitalia normal, right bartholin cyst  Palpable , essentially non tender , mobile , and no drainage , 2.5 cm in diameter ;  Left normal   Pelvic Support system : normal      Ass: Rightr Bartholin Gland Dcut Cysdt   No evidence for abscess at this time     Spent 25 minutes with the patient ; Face to Face, with >50% of this time spent in counseling and coordination of care, surrounding the above mentioned issues as well as:discuss possible scenarios of enlarging , etc , with drainage     P. Augmentin 875 mg BID x 7   Sitz bathes BID       Follow up : Return visit in 1 week(s)

## 2019-08-10 NOTE — TELEPHONE ENCOUNTER
Called Estelita Meneses to confirm medication fax was received per Donna this was received.   [phone]

## 2019-08-14 ENCOUNTER — TELEPHONE (OUTPATIENT)
Dept: OBGYN | Facility: CLINIC | Age: 49
End: 2019-08-14

## 2019-08-14 NOTE — TELEPHONE ENCOUNTER
Pt called stating she has a follow-up marcy Suero tomorrow to check Bartholin cyst and pt started her period and wants to know if she can still come to her appt. Adv pt she may still come. Pt had no further questions

## 2019-08-15 ENCOUNTER — GYNECOLOGY VISIT (OUTPATIENT)
Dept: OBGYN | Facility: CLINIC | Age: 49
End: 2019-08-15
Payer: COMMERCIAL

## 2019-08-15 VITALS — DIASTOLIC BLOOD PRESSURE: 82 MMHG | BODY MASS INDEX: 31.32 KG/M2 | WEIGHT: 200 LBS | SYSTOLIC BLOOD PRESSURE: 124 MMHG

## 2019-08-15 DIAGNOSIS — N75.0 BARTHOLIN CYST: ICD-10-CM

## 2019-08-15 DIAGNOSIS — K21.9 GASTROESOPHAGEAL REFLUX DISEASE WITHOUT ESOPHAGITIS: ICD-10-CM

## 2019-08-15 PROCEDURE — 99212 OFFICE O/P EST SF 10 MIN: CPT | Performed by: OBSTETRICS & GYNECOLOGY

## 2019-08-15 RX ORDER — OMEPRAZOLE 40 MG/1
40 CAPSULE, DELAYED RELEASE ORAL DAILY
Qty: 90 CAP | Refills: 2 | Status: SHIPPED | OUTPATIENT
Start: 2019-08-15 | End: 2023-07-31

## 2019-08-15 NOTE — NON-PROVIDER
Pt here to F/u on bartholin cyst   Currently taking Augmentin BID  States cyst is smaller   LMP:08/14/19

## 2019-08-15 NOTE — PROGRESS NOTES
49 y.o.  female , S/P evaluation and initiation of Rx for left bartholin duct cyst , last week .Patient S/P complete course of augmentin and BId sitz bathe. Lesion is smaller , non tende r, and almost gone .     No past medical history on file.  There are no active problems to display for this patient.      Social History     Socioeconomic History   • Marital status:      Spouse name: Not on file   • Number of children: Not on file   • Years of education: Not on file   • Highest education level: Not on file   Occupational History   • Not on file   Social Needs   • Financial resource strain: Not on file   • Food insecurity:     Worry: Not on file     Inability: Not on file   • Transportation needs:     Medical: Not on file     Non-medical: Not on file   Tobacco Use   • Smoking status: Never Smoker   • Smokeless tobacco: Never Used   Substance and Sexual Activity   • Alcohol use: No   • Drug use: No   • Sexual activity: Never   Lifestyle   • Physical activity:     Days per week: Not on file     Minutes per session: Not on file   • Stress: Not on file   Relationships   • Social connections:     Talks on phone: Not on file     Gets together: Not on file     Attends [employer] service: Not on file     Active member of club or organization: Not on file     Attends meetings of clubs or organizations: Not on file     Relationship status: Not on file   • Intimate partner violence:     Fear of current or ex partner: Not on file     Emotionally abused: Not on file     Physically abused: Not on file     Forced sexual activity: Not on file   Other Topics Concern   • Not on file   Social History Narrative   • Not on file       Current Outpatient Medications on File Prior to Visit   Medication Sig Dispense Refill   • amoxicillin-clavulanate (AUGMENTIN) 875-125 MG Tab Take 1 Tab by mouth 2 times a day. 14 Tab 0   • medroxyPROGESTERone (PROVERA) 10 MG Tab Take 1 Tab by mouth every day. (Patient not taking: Reported on  4/16/2019) 10 Tab 6   • progesterone (PROMETRIUM) 200 MG capsule Take 1 Cap by mouth every day. (Patient not taking: Reported on 4/16/2019) 30 Cap 11   • MICONAZOLE NITRATE VAGINAL (MONISTAT 3) 4 % Cream 1 applicatorful per vagina HS x 3 days (Patient not taking: Reported on 4/16/2019) 1 Tube 2   • progesterone (PROMETRIUM) 200 MG capsule Take 1 Cap by mouth every day. (Patient not taking: Reported on 4/16/2019) 10 Cap 11   • Omega-3 Fatty Acids (FISH OIL) 1000 MG Cap capsule Take 1,000 mg by mouth 3 times a day, with meals.     • Omeprazole Magnesium 20.6 (20 BASE) MG CAPSULE DELAYED RELEASE Take  by mouth.     • meclizine (ANTIVERT) 25 MG Tab Take 1 Tab by mouth 4 times a day as needed for Dizziness. (Patient not taking: Reported on 4/16/2019) 20 Tab 0   • naproxen (NAPROSYN) 500 MG Tab Take 1 Tab by mouth 2 times a day as needed. (Patient not taking: Reported on 4/16/2019) 30 Tab 0   • omeprazole (PRILOSEC) 40 MG delayed-release capsule Take 1 Cap by mouth every day. (Patient not taking: Reported on 4/16/2019) 30 Cap 2     No current facility-administered medications on file prior to visit.        Vitals:    08/15/19 1407   BP: 124/82       Exam : deferred     Ass Left bartholin gland duct cyst : Resolving     P. Continue Sitz Bathes, until resolved   Refill Prilosec per patient request: prilosec 40 mg daily

## 2020-01-13 ENCOUNTER — OFFICE VISIT (OUTPATIENT)
Dept: URGENT CARE | Facility: PHYSICIAN GROUP | Age: 50
End: 2020-01-13
Payer: COMMERCIAL

## 2020-01-13 VITALS
RESPIRATION RATE: 16 BRPM | OXYGEN SATURATION: 96 % | DIASTOLIC BLOOD PRESSURE: 88 MMHG | HEART RATE: 102 BPM | HEIGHT: 72 IN | TEMPERATURE: 99.5 F | SYSTOLIC BLOOD PRESSURE: 122 MMHG | BODY MASS INDEX: 27.09 KG/M2 | WEIGHT: 200 LBS

## 2020-01-13 DIAGNOSIS — J11.1 INFLUENZA-LIKE ILLNESS: ICD-10-CM

## 2020-01-13 DIAGNOSIS — R50.9 FEVER, UNSPECIFIED FEVER CAUSE: ICD-10-CM

## 2020-01-13 DIAGNOSIS — R05.9 COUGH: ICD-10-CM

## 2020-01-13 LAB
FLUAV+FLUBV AG SPEC QL IA: NEGATIVE
INT CON NEG: NEGATIVE
INT CON POS: POSITIVE

## 2020-01-13 PROCEDURE — 87804 INFLUENZA ASSAY W/OPTIC: CPT | Performed by: PHYSICIAN ASSISTANT

## 2020-01-13 PROCEDURE — 99203 OFFICE O/P NEW LOW 30 MIN: CPT | Performed by: PHYSICIAN ASSISTANT

## 2020-01-13 RX ORDER — CODEINE PHOSPHATE/GUAIFENESIN 10-100MG/5
10 LIQUID (ML) ORAL 4 TIMES DAILY PRN
Qty: 200 ML | Refills: 0 | Status: SHIPPED | OUTPATIENT
Start: 2020-01-13 | End: 2020-01-18

## 2020-01-13 RX ORDER — OSELTAMIVIR PHOSPHATE 75 MG/1
75 CAPSULE ORAL 2 TIMES DAILY
Qty: 10 CAP | Refills: 0 | Status: SHIPPED | OUTPATIENT
Start: 2020-01-13 | End: 2023-07-31

## 2020-01-13 RX ORDER — ACETAMINOPHEN 500 MG
500-1000 TABLET ORAL EVERY 6 HOURS PRN
COMMUNITY
End: 2023-06-28

## 2020-01-13 RX ORDER — IBUPROFEN 200 MG
200 TABLET ORAL EVERY 6 HOURS PRN
COMMUNITY
End: 2023-06-28

## 2020-01-13 ASSESSMENT — ENCOUNTER SYMPTOMS: COUGH: 1

## 2020-01-13 NOTE — LETTER
January 13, 2020         Patient: Sangeetha Coates   YOB: 1970   Date of Visit: 1/13/2020           To Whom it May Concern:    Sangeetha Coates was seen in my clinic on 1/13/2020. She may return to work on 1/18/2020 or sooner if condition improves sooner.       If you have any questions or concerns, please don't hesitate to call.        Sincerely,           Debra Landin P.A.-C.  Electronically Signed

## 2020-01-14 NOTE — PATIENT INSTRUCTIONS
Gripe en los adultos  (Influenza, Adult)  La gripe es doug infección viral que afecta principalmente las vías respiratorias. Las vías respiratorias incluyen órganos que ayudan a respirar, freddie los pulmones, la nariz y la garganta. La gripe provoca muchos síntomas del resfrío común, así freddie fiebre sunny y dolor corporal.  Se transmite fácilmente de persona a persona (es contagiosa). La mejor manera de prevenir la gripe es aplicándose la vacuna contra la gripe todos los años.  CAUSAS  La gripe es causada por un virus. Se puede contagiar el virus de las siguientes maneras:  · Al aspirar las gotitas que doug persona infectada elimina al toser o estornudar.  · Al tocar algo que fue recientemente contaminado con el virus y luego llevarse la mano a la boca, la nariz o los ojos.  FACTORES DE RIESGO  Los siguientes factores pueden hacer que usted sea propenso a contraer la gripe:  · No lavarse las claudy frecuentemente con agua y jabón o un desinfectante de claudy a base de alcohol.  · Tener contacto cercano con muchas personas latrell la temporada de resfrío y gripe.  · Tocarse la boca, los ojos o la nariz sin lavarse ni desinfectarse las claudy francesca.  · No beber suficientes líquidos o no seguir doug dieta saludable.  · No dormir lo suficiente o no hacer suficiente actividad física.  · Tener un alto echo de estrés.  · No colocarse la vacuna anual contra la gripe.  Puede correr un mayor riesgo de complicaciones de la gripe, freddie doug infección pulmonar grave (neumonía) si:  · Tiene más de 65 años.  · Está embarazada.  · Tiene un sistema que combate las defensas (sistema inmunitario) debilitado. Puede tener un sistema inmunitario debilitado si:  ¨ Tiene VIH o sida.  ¨ Está recibiendo quimioterapia.  ¨ Usa medicamentos que reducen la actividad (suprimen) del sistema inmunitario.  · Tiene doug enfermedad a rosalina plazo (crónica), freddie cardiopatía coronaria, enfermedad renal, diabetes o enfermedad pulmonar.  · Tiene un trastorno  hepático.  · Son obesas.  · Tiene anemia.  SÍNTOMAS  Los síntomas de esta afección por lo general tijerina entre 4 y 10 días, y pueden tener los siguientes síntomas:  · Fiebre.  · Escalofríos.  · Dolor de marian, juli en el cuerpo o juli musculares.  · Dolor de garganta.  · Tos.  · Secreción o congestión nasal.  · Molestias en el pecho y tos.  · Pérdida del apetito.  · Debilidad o cansancio (fatiga).  · Mareos.  · Náuseas o vómitos.  DIAGNÓSTICO  Esta afección se puede diagnosticar en función de los antecedentes médicos y un examen físico. El médico puede indicarle un cultivo faríngeo o nasal para confirmar el diagnóstico.  TRATAMIENTO  Si la gripe se detecta de manera temprana, puede recibir tratamiento con medicamentos antivirales que pueden reducir la duración de la enfermedad y la gravedad de los síntomas. [place] medicamento se puede administrar por vía oral o por vía intravenosa (IV), es decir, a través de un tubo que se coloca en doug vena.  El objetivo del tratamiento es aliviar los síntomas cuidándose en payne casa. [place] puede incluir usar medicamentos de venta jarred, beber doug gran cantidad de líquidos y agregar humedad al aire en payne casa.  En algunos casos, la gripe se anna sin tratamiento. Los casos graves o las complicaciones de gripe se pueden tratar en un hospital.  INSTRUCCIONES PARA EL CUIDADO EN EL HOGAR  · [place] los medicamentos de venta jarred y los recetados solamente freddie se lo haya indicado el médico.  · Use un humidificador de aire frío para agregar humedad al aire de payne casa. [place] puede ayudarlo a respirar mejor.  · Descanse todo lo que sea necesario.  · Amy suficiente líquido para mantener la orina peng o de color amarillo pálido.  · Al toser o estornudar, cúbrase la boca y la nariz.  · Lávese las claudy con agua y jabón frecuentemente, en especial después de toser o estornudar. Use desinfectante para claudy si no dispone de agua y jabón.  · Quédese en payne casa y no concurra al trabajo o a la  escuela, freddie se lo haya indicado el médico. A menos que visite al médico, evite salir de payne casa hasta que no tenga fiebre latrell 24 horas sin el uso de medicamentos.  · Concurra a todas las visitas de control freddie se lo haya indicado el médico. [place] es importante.  PREVENCIÓN  · Aplicarse la vacuna anual contra la gripe es la mejor manera de evitar contagiarse la gripe. Puede aplicarse la vacuna contra la gripe a fines de verano, en otoño o en invierno. Pregúntele al médico cuándo debe aplicarse la vacuna contra la gripe.  · Lávese las claudy o use un desinfectante de claudy con frecuencia.  · Evite el contacto con personas que están enfermas latrell la temporada de resfrío y gripe.  · Siga doug dieta saludable, maylin muchos líquidos, duerma lo suficiente y realice actividad física con regularidad.  SOLICITE ATENCIÓN MÉDICA SI:  · Presenta nuevos síntomas.  · Tiene los siguientes síntomas:  ¨ Dolor en el pecho.  ¨ Diarrea.  ¨ Fiebre.  · La tos empeora.  · Produce más mucosidad.  · Siente náuseas o vomita.  SOLICITE ATENCIÓN MÉDICA DE INMEDIATO SI:  · Le falta el aire o tiene dificultad para respirar.  · La piel o las uñas se tornan de un color azulado.  · Presenta dolor intenso o rigidez de cecilia.  · Le duele la marian de forma repentina o tiene dolor en la braydon o el oído.  · No puede dejar de vomitar.  Esta información no tiene freddie fin reemplazar el consejo del médico. Asegúrese de hacerle al médico cualquier pregunta que tenga.  Document Released: 09/27/2006 Document Revised: 04/10/2017 Document Reviewed: 10/11/2016  Elsevier Interactive Patient Education © 2017 [employer] Inc[employer]

## 2020-01-14 NOTE — PROGRESS NOTES
Subjective:      Sangeetha Coates is a 50 y.o. female who presents with Cough (headache, fever, ear [id number] )    PMH:  has no past medical history on file.  MEDS:   Current Outpatient Medications:   •  acetaminophen (TYLENOL) 500 MG Tab, Take 500-1,000 mg by mouth every 6 hours as needed., Disp: , Rfl:   •  ibuprofen (MOTRIN) 200 MG Tab, Take 200 mg by mouth every 6 hours as needed., Disp: , Rfl:   •  omeprazole (PRILOSEC) 40 MG delayed-release capsule, Take 1 Cap by mouth every day., Disp: 90 Cap, Rfl: 2  •  amoxicillin-clavulanate (AUGMENTIN) 875-125 MG Tab, Take 1 Tab by mouth 2 times a day. (Patient not taking: Reported on 1/13/2020), Disp: 14 Tab, Rfl: 0  •  medroxyPROGESTERone (PROVERA) 10 MG Tab, Take 1 Tab by mouth every day. (Patient not taking: Reported on 4/16/2019), Disp: 10 Tab, Rfl: 6  •  progesterone (PROMETRIUM) 200 MG capsule, Take 1 Cap by mouth every day. (Patient not taking: Reported on 4/16/2019), Disp: 30 Cap, Rfl: 11  •  MICONAZOLE NITRATE VAGINAL (MONISTAT 3) 4 % Cream, 1 applicatorful per vagina HS x 3 days (Patient not taking: Reported on 4/16/2019), Disp: 1 Tube, Rfl: 2  •  progesterone (PROMETRIUM) 200 MG capsule, Take 1 Cap by mouth every day. (Patient not taking: Reported on 4/16/2019), Disp: 10 Cap, Rfl: 11  •  Omega-3 Fatty Acids (FISH OIL) 1000 MG Cap capsule, Take 1,000 mg by mouth 3 times a day, with meals., Disp: , Rfl:   •  Omeprazole Magnesium 20.6 (20 BASE) MG CAPSULE DELAYED RELEASE, Take  by mouth., Disp: , Rfl:   •  meclizine (ANTIVERT) 25 MG Tab, Take 1 Tab by mouth 4 times a day as needed for Dizziness. (Patient not taking: Reported on 4/16/2019), Disp: 20 Tab, Rfl: 0  •  naproxen (NAPROSYN) 500 MG Tab, Take 1 Tab by mouth 2 times a day as needed. (Patient not taking: Reported on 4/16/2019), Disp: 30 Tab, Rfl: 0  •  omeprazole (PRILOSEC) 40 MG delayed-release capsule, Take 1 Cap by mouth every day. (Patient not taking: Reported on 4/16/2019), Disp: 30 Cap, Rfl:  "2  ALLERGIES: No Known Allergies  SURGHX:   Past Surgical History:   Procedure Laterality Date   • PELVISCOPY  7/15/2011    Performed by JERSON COYNE at [facility]   • OVARIAN CYSTECTOMY  7/15/2011    Performed by JERSON COYNE at [facility]     SOCHX:  reports that she has never smoked. She has never used smokeless tobacco. She reports that she does not drink alcohol or use drugs.  FH: Reviewed with patient, not pertinent to this visit.           Patient presents with:  Cough: headache, fever, ear [id number] . Pt states she feels terrible, otc medicines not helping. Pt did not get flu vaccine this year.         Cough   This is a new problem. Episode onset: 3 days. The problem has been rapidly worsening. The problem occurs every few minutes. The cough is productive of sputum. Associated symptoms include chills, ear pain, a fever, headaches, myalgias, nasal congestion, postnasal drip, rhinorrhea, a sore throat and sweats. Pertinent negatives include no chest pain, shortness of breath or wheezing. The symptoms are aggravated by lying down and cold air (exertion). She has tried OTC cough suppressant and rest (motrin) for the symptoms. The treatment provided no relief.       Review of Systems   Constitutional: Positive for chills, fever and malaise/fatigue.   HENT: Positive for congestion, ear pain, postnasal drip, rhinorrhea and sore throat.    Eyes: Negative for discharge.   Respiratory: Positive for cough and sputum production. Negative for shortness of breath and wheezing.    Cardiovascular: Negative for chest pain.   Gastrointestinal: Negative for diarrhea, nausea and vomiting.   Musculoskeletal: Positive for myalgias.   Neurological: Positive for headaches.   All other systems reviewed and are negative.         Objective:     /88   Pulse (!) 102   Temp 37.5 °C (99.5 °F) (Temporal)   Resp 16   Ht 1.854 m (6' 1\")   Wt 90.7 kg (200 lb)   SpO2 96%   BMI 26.39 " kg/m²      Physical Exam  Vitals signs and nursing note reviewed.   Constitutional:       General: She is not in acute distress.     Appearance: Normal appearance. She is well-developed. She is not toxic-appearing or diaphoretic.   HENT:      Head: Normocephalic and atraumatic.      Right Ear: Tympanic membrane normal.      Left Ear: Tympanic membrane normal.      Nose: Mucosal edema, congestion and rhinorrhea present.      Mouth/Throat:      Mouth: Mucous membranes are moist.      Pharynx: Uvula midline. Posterior oropharyngeal erythema present.      Tonsils: No tonsillar exudate.   Eyes:      General: Lids are normal.      Extraocular Movements: Extraocular movements intact.      Conjunctiva/sclera:      Right eye: Right conjunctiva is injected.      Left eye: Left conjunctiva is injected.      Pupils: Pupils are equal, round, and reactive to light.   Neck:      Musculoskeletal: Normal range of motion and neck supple.   Cardiovascular:      Rate and Rhythm: Regular rhythm. Tachycardia present.      Pulses: Normal pulses.      Heart sounds: Normal heart sounds.   Pulmonary:      Effort: Pulmonary effort is normal. No respiratory distress.      Breath sounds: No wheezing or rales.   Abdominal:      Palpations: Abdomen is soft.   Musculoskeletal: Normal range of motion.   Skin:     General: Skin is warm and dry.      Capillary Refill: Capillary refill takes less than 2 seconds.   Neurological:      General: No focal deficit present.      Mental Status: She is alert and oriented to person, place, and time.      Gait: Gait normal.   Psychiatric:         Mood and Affect: Mood normal.         Behavior: Behavior is cooperative.                 Assessment/Plan:     1. Influenza-like illness  POCT Influenza A/B    oseltamivir (TAMIFLU) 75 MG Cap    guaifenesin-codeine (TUSSI-ORGANIDIN NR) 100-10 MG/5ML syrup   2. Fever, unspecified fever cause  oseltamivir (TAMIFLU) 75 MG Cap    guaifenesin-codeine (TUSSI-ORGANIDIN NR)  100-10 MG/5ML syrup   3. Cough  oseltamivir (TAMIFLU) 75 MG Cap    guaifenesin-codeine (TUSSI-ORGANIDIN NR) 100-10 MG/5ML syrup     PT can continue OTC medications, increase fluids and rest until symptoms improve.     PT instructed not to drive or operate heavy machinery or drink alcohol while taking this medication because it contains either a narcotic or benzodiazepines which causes drowsiness. PT verbalized understanding of these instructions.     [facility] Aware web site evaluation: I have obtained and reviewed patient utilization report from [facility] pharmacy database prior to writing prescription for controlled substance.  No history of abuse.    PT should follow up with PCP in 1-2 days for re-evaluation if symptoms have not improved.  Discussed red flags and reasons to return to UC or ED.  Pt and/or family verbalized understanding of diagnosis and follow up instructions and was offered informational handout on diagnosis.  PT discharged.

## 2020-01-19 ASSESSMENT — ENCOUNTER SYMPTOMS
CHILLS: 1
EYE DISCHARGE: 0
WHEEZING: 0
FEVER: 1
SPUTUM PRODUCTION: 1
MYALGIAS: 1
SWEATS: 1
DIARRHEA: 0
HEADACHES: 1
NAUSEA: 0
VOMITING: 0
SORE THROAT: 1
RHINORRHEA: 1
SHORTNESS OF BREATH: 0

## 2020-01-27 ENCOUNTER — HOSPITAL ENCOUNTER (OUTPATIENT)
Dept: LAB | Facility: MEDICAL CENTER | Age: 50
End: 2020-01-27
Attending: INTERNAL MEDICINE
Payer: COMMERCIAL

## 2020-01-27 LAB
ALBUMIN SERPL BCP-MCNC: 4.4 G/DL (ref 3.2–4.9)
ALBUMIN/GLOB SERPL: 1.4 G/DL
ALP SERPL-CCNC: 75 U/L (ref 30–99)
ALT SERPL-CCNC: 15 U/L (ref 2–50)
ANION GAP SERPL CALC-SCNC: 8 MMOL/L (ref 0–11.9)
AST SERPL-CCNC: 15 U/L (ref 12–45)
BASOPHILS # BLD AUTO: 0.3 % (ref 0–1.8)
BASOPHILS # BLD: 0.02 K/UL (ref 0–0.12)
BILIRUB SERPL-MCNC: 0.6 MG/DL (ref 0.1–1.5)
BUN SERPL-MCNC: 20 MG/DL (ref 8–22)
CALCIUM SERPL-MCNC: 9.3 MG/DL (ref 8.5–10.5)
CHLORIDE SERPL-SCNC: 103 MMOL/L (ref 96–112)
CO2 SERPL-SCNC: 26 MMOL/L (ref 20–33)
CREAT SERPL-MCNC: 0.62 MG/DL (ref 0.5–1.4)
EOSINOPHIL # BLD AUTO: 0.08 K/UL (ref 0–0.51)
EOSINOPHIL NFR BLD: 1.2 % (ref 0–6.9)
ERYTHROCYTE [DISTWIDTH] IN BLOOD BY AUTOMATED COUNT: 42.6 FL (ref 35.9–50)
GLOBULIN SER CALC-MCNC: 3.1 G/DL (ref 1.9–3.5)
GLUCOSE SERPL-MCNC: 85 MG/DL (ref 65–99)
HCT VFR BLD AUTO: 42.9 % (ref 37–47)
HGB BLD-MCNC: 14.4 G/DL (ref 12–16)
IMM GRANULOCYTES # BLD AUTO: 0.03 K/UL (ref 0–0.11)
IMM GRANULOCYTES NFR BLD AUTO: 0.5 % (ref 0–0.9)
LYMPHOCYTES # BLD AUTO: 3.03 K/UL (ref 1–4.8)
LYMPHOCYTES NFR BLD: 46.5 % (ref 22–41)
MCH RBC QN AUTO: 31 PG (ref 27–33)
MCHC RBC AUTO-ENTMCNC: 33.6 G/DL (ref 33.6–35)
MCV RBC AUTO: 92.5 FL (ref 81.4–97.8)
MONOCYTES # BLD AUTO: 0.64 K/UL (ref 0–0.85)
MONOCYTES NFR BLD AUTO: 9.8 % (ref 0–13.4)
NEUTROPHILS # BLD AUTO: 2.72 K/UL (ref 2–7.15)
NEUTROPHILS NFR BLD: 41.7 % (ref 44–72)
NRBC # BLD AUTO: 0 K/UL
NRBC BLD-RTO: 0 /100 WBC
PLATELET # BLD AUTO: 293 K/UL (ref 164–446)
PMV BLD AUTO: 10.2 FL (ref 9–12.9)
POTASSIUM SERPL-SCNC: 4.3 MMOL/L (ref 3.6–5.5)
PROT SERPL-MCNC: 7.5 G/DL (ref 6–8.2)
RBC # BLD AUTO: 4.64 M/UL (ref 4.2–5.4)
SODIUM SERPL-SCNC: 137 MMOL/L (ref 135–145)
WBC # BLD AUTO: 6.5 K/UL (ref 4.8–10.8)

## 2020-01-27 PROCEDURE — 36415 COLL VENOUS BLD VENIPUNCTURE: CPT

## 2020-01-27 PROCEDURE — 85025 COMPLETE CBC W/AUTO DIFF WBC: CPT

## 2020-01-27 PROCEDURE — 80053 COMPREHEN METABOLIC PANEL: CPT

## 2020-07-20 ENCOUNTER — GYNECOLOGY VISIT (OUTPATIENT)
Dept: OBGYN | Facility: CLINIC | Age: 50
End: 2020-07-20
Payer: COMMERCIAL

## 2020-07-20 ENCOUNTER — HOSPITAL ENCOUNTER (OUTPATIENT)
Facility: MEDICAL CENTER | Age: 50
End: 2020-07-20
Attending: OBSTETRICS & GYNECOLOGY
Payer: COMMERCIAL

## 2020-07-20 VITALS — WEIGHT: 205 LBS | DIASTOLIC BLOOD PRESSURE: 76 MMHG | BODY MASS INDEX: 27.05 KG/M2 | SYSTOLIC BLOOD PRESSURE: 127 MMHG

## 2020-07-20 DIAGNOSIS — Z01.419 WELL WOMAN EXAM WITH ROUTINE GYNECOLOGICAL EXAM: ICD-10-CM

## 2020-07-20 DIAGNOSIS — N89.8 VAGINAL DISCHARGE: ICD-10-CM

## 2020-07-20 PROCEDURE — 87660 TRICHOMONAS VAGIN DIR PROBE: CPT

## 2020-07-20 PROCEDURE — 99396 PREV VISIT EST AGE 40-64: CPT | Performed by: OBSTETRICS & GYNECOLOGY

## 2020-07-20 PROCEDURE — 87480 CANDIDA DNA DIR PROBE: CPT

## 2020-07-20 PROCEDURE — 87510 GARDNER VAG DNA DIR PROBE: CPT

## 2020-07-20 ASSESSMENT — FIBROSIS 4 INDEX: FIB4 SCORE: 0.63

## 2020-07-21 LAB
AMBIGUOUS DTTM AMBI4: NORMAL
CANDIDA DNA VAG QL PROBE+SIG AMP: NEGATIVE
G VAGINALIS DNA VAG QL PROBE+SIG AMP: NEGATIVE
SIGNIFICANT IND 70042: NORMAL
SITE SITE: NORMAL
SOURCE SOURCE: NORMAL
T VAGINALIS DNA VAG QL PROBE+SIG AMP: NEGATIVE

## 2020-07-21 NOTE — PROGRESS NOTES
Subjective:      Sangeetha Coates is a 50 y.o. female who presents with Gynecologic Exam (annual)    The  phone was used for this visit      CC: annual exam    HPI: 50-year-old  0, last menstrual period 2020, not using contraception, who presents for an annual exam.  Menses are described as regular, lasting 1 to 2 days.  For the past 3 to 4 months she complains of pain associated with her periods.  She also complains of vaginal itching and burning for the past 2 weeks.  She is in a monogamous relationship, and not concerned about STDs.  Her last Pap smear was 2019 and within normal limits.    History reviewed. No pertinent past medical history.    Past Surgical History:   Procedure Laterality Date   • PELVISCOPY  7/15/2011    Performed by JERSON COYNE at SURGERY SAME DAY [facility]   • OVARIAN CYSTECTOMY  7/15/2011    Performed by JERSON COYNE at [facility]ERY [facility] DAY [facility]         Current Outpatient Medications:   •  Omeprazole Magnesium 20.6 (20 BASE) MG CAPSULE DELAYED RELEASE, Take  by mouth., Disp: , Rfl:   •  acetaminophen (TYLENOL) 500 MG Tab, Take 500-1,000 mg by mouth every 6 hours as needed., Disp: , Rfl:   •  ibuprofen (MOTRIN) 200 MG Tab, Take 200 mg by mouth every 6 hours as needed., Disp: , Rfl:   •  oseltamivir (TAMIFLU) 75 MG Cap, Take 1 Cap by mouth 2 times a day. (Patient not taking: Reported on 2020), Disp: 10 Cap, Rfl: 0  •  omeprazole (PRILOSEC) 40 MG delayed-release capsule, Take 1 Cap by mouth every day. (Patient not taking: Reported on 2020), Disp: 90 Cap, Rfl: 2  •  amoxicillin-clavulanate (AUGMENTIN) 875-125 MG Tab, Take 1 Tab by mouth 2 times a day. (Patient not taking: Reported on 2020), Disp: 14 Tab, Rfl: 0  •  medroxyPROGESTERone (PROVERA) 10 MG Tab, Take 1 Tab by mouth every day. (Patient not taking: Reported on 2019), Disp: 10 Tab, Rfl: 6  •  progesterone (PROMETRIUM) 200 MG capsule, Take 1 Cap by mouth  every day. (Patient not taking: Reported on 4/16/2019), Disp: 30 Cap, Rfl: 11  •  MICONAZOLE NITRATE VAGINAL (MONISTAT 3) 4 % Cream, 1 applicatorful per vagina HS x 3 days (Patient not taking: Reported on 4/16/2019), Disp: 1 Tube, Rfl: 2  •  progesterone (PROMETRIUM) 200 MG capsule, Take 1 Cap by mouth every day. (Patient not taking: Reported on 4/16/2019), Disp: 10 Cap, Rfl: 11  •  Omega-3 Fatty Acids (FISH OIL) 1000 MG Cap capsule, Take 1,000 mg by mouth 3 times a day, with meals., Disp: , Rfl:   •  meclizine (ANTIVERT) 25 MG Tab, Take 1 Tab by mouth 4 times a day as needed for Dizziness. (Patient not taking: Reported on 4/16/2019), Disp: 20 Tab, Rfl: 0  •  naproxen (NAPROSYN) 500 MG Tab, Take 1 Tab by mouth 2 times a day as needed. (Patient not taking: Reported on 4/16/2019), Disp: 30 Tab, Rfl: 0  •  omeprazole (PRILOSEC) 40 MG delayed-release capsule, Take 1 Cap by mouth every day. (Patient not taking: Reported on 4/16/2019), Disp: 30 Cap, Rfl: 2    Patient has no known allergies.    Family History   Problem Relation Age of Onset   • Diabetes Other    • Hypertension Other        Social History     Socioeconomic History   • Marital status:      Spouse name: Not on file   • Number of children: Not on file   • Years of education: Not on file   • Highest education level: Not on file   Occupational History   • Not on file   Social Needs   • Financial resource strain: Not on file   • Food insecurity     Worry: Not on file     Inability: Not on file   • Transportation needs     Medical: Not on file     Non-medical: Not on file   Tobacco Use   • Smoking status: Never Smoker   • Smokeless tobacco: Never Used   Substance and Sexual Activity   • Alcohol use: No   • Drug use: No   • Sexual activity: Never   Lifestyle   • Physical activity     Days per week: Not on file     Minutes per session: Not on file   • Stress: Not on file   Relationships   • Social connections     Talks on phone: Not on file     Gets together:  Not on file     Attends Holiness service: Not on file     Active member of club or organization: Not on file     Attends meetings of clubs or organizations: Not on file     Relationship status: Not on file   • Intimate partner violence     Fear of current or ex partner: Not on file     Emotionally abused: Not on file     Physically abused: Not on file     Forced sexual activity: Not on file   Other Topics Concern   • Not on file   Social History Narrative   • Not on file       OB History    Para Term  AB Living   0 0 0 0 0 0   SAB TAB Ectopic Molar Multiple Live Births   0 0 0 0 0 0       ROS REVIEW OF SYSTEMS:    Pertinent positives and negatives mentioned in HPI.    All other systems reviewed and are negative or noncontributory.       Objective:     /76   Wt 93 kg (205 lb)   LMP 2020   BMI 27.05 kg/m²      Physical Exam      GENERAL: Alert, in no apparent distress  PSYCHIATRIC: Appropriate affect, intact insight and judgement.  NECK:  Nontender, no masses.  No Thyromegaly or nodules. No lymphadenopathy.  RESPIRATORY: Normal respiratory effort.  Lungs clear to auscultation.   CARDIOVASCULAR: RRR, no murmur, gallop, or rub.  ABDOMEN: Soft, nontender, nondistended.  No palpable masses.  No rebound or guarding.  No inguinal lymphadenopathy.  No hepatosplenomegaly.  No hernias.  BACK: No CVA tenderness  EXTREMITIES: No edema  SKIN: No rash    BREAST: No masses or tenderness.  No skin changes.  No nipple inversion or discharge. No axillary lymphadenopathy.      GENITOURINARY:  Normal external genitalia, no lesions.  Normal urethral meatus, no masses or tenderness.  Normal bladder without fullness or masses.  Vagina well estrogenized, a small amount of white vaginal discharge is present in the vagina.  Cervix without lesions or discharge, nontender.  Uterus normal size, shape, and contour, nontender.  Adnexa nontender, no masses.  Normal anus and perineum.    Rectal Exam - not indicated.      Assessment/Plan:       1. Well woman exam with routine gynecological exam  Reviewed monthly self breast exams and need for yearly screening mammograms starting at age 40.  Discussed calcium intake, Vitamin D,  and weight bearing exercise for bone health.  A mammogram is scheduled for July 31, 2020.  Pap smear is current.    2. Vaginal discharge  Vaginal pathogen swab obtained.  Will call if positive and need for treatment.  - VAGINAL PATHOGENS DNA PANEL; Future    Follow-up PRN

## 2021-04-29 ENCOUNTER — HOSPITAL ENCOUNTER (OUTPATIENT)
Dept: LAB | Facility: MEDICAL CENTER | Age: 51
End: 2021-04-29
Attending: FAMILY MEDICINE
Payer: COMMERCIAL

## 2021-04-29 LAB
ALBUMIN SERPL BCP-MCNC: 4.5 G/DL (ref 3.2–4.9)
ALBUMIN/GLOB SERPL: 1.5 G/DL
ALP SERPL-CCNC: 96 U/L (ref 30–99)
ALT SERPL-CCNC: 17 U/L (ref 2–50)
ANION GAP SERPL CALC-SCNC: 11 MMOL/L (ref 7–16)
AST SERPL-CCNC: 17 U/L (ref 12–45)
BASOPHILS # BLD AUTO: 0.4 % (ref 0–1.8)
BASOPHILS # BLD: 0.03 K/UL (ref 0–0.12)
BILIRUB SERPL-MCNC: 0.6 MG/DL (ref 0.1–1.5)
BUN SERPL-MCNC: 20 MG/DL (ref 8–22)
CALCIUM SERPL-MCNC: 8.9 MG/DL (ref 8.5–10.5)
CHLORIDE SERPL-SCNC: 104 MMOL/L (ref 96–112)
CHOLEST SERPL-MCNC: 161 MG/DL (ref 100–199)
CO2 SERPL-SCNC: 24 MMOL/L (ref 20–33)
CREAT SERPL-MCNC: 0.59 MG/DL (ref 0.5–1.4)
EOSINOPHIL # BLD AUTO: 0.12 K/UL (ref 0–0.51)
EOSINOPHIL NFR BLD: 1.7 % (ref 0–6.9)
ERYTHROCYTE [DISTWIDTH] IN BLOOD BY AUTOMATED COUNT: 43.7 FL (ref 35.9–50)
EST. AVERAGE GLUCOSE BLD GHB EST-MCNC: 103 MG/DL
GLOBULIN SER CALC-MCNC: 3.1 G/DL (ref 1.9–3.5)
GLUCOSE SERPL-MCNC: 105 MG/DL (ref 65–99)
HBA1C MFR BLD: 5.2 % (ref 4–5.6)
HCT VFR BLD AUTO: 42.9 % (ref 37–47)
HDLC SERPL-MCNC: 41 MG/DL
HGB BLD-MCNC: 14.8 G/DL (ref 12–16)
IMM GRANULOCYTES # BLD AUTO: 0.03 K/UL (ref 0–0.11)
IMM GRANULOCYTES NFR BLD AUTO: 0.4 % (ref 0–0.9)
LDLC SERPL CALC-MCNC: 100 MG/DL
LYMPHOCYTES # BLD AUTO: 2.29 K/UL (ref 1–4.8)
LYMPHOCYTES NFR BLD: 32.6 % (ref 22–41)
MCH RBC QN AUTO: 31.8 PG (ref 27–33)
MCHC RBC AUTO-ENTMCNC: 34.5 G/DL (ref 33.6–35)
MCV RBC AUTO: 92.1 FL (ref 81.4–97.8)
MONOCYTES # BLD AUTO: 0.58 K/UL (ref 0–0.85)
MONOCYTES NFR BLD AUTO: 8.3 % (ref 0–13.4)
NEUTROPHILS # BLD AUTO: 3.98 K/UL (ref 2–7.15)
NEUTROPHILS NFR BLD: 56.6 % (ref 44–72)
NRBC # BLD AUTO: 0 K/UL
NRBC BLD-RTO: 0 /100 WBC
PLATELET # BLD AUTO: 228 K/UL (ref 164–446)
PMV BLD AUTO: 10 FL (ref 9–12.9)
POTASSIUM SERPL-SCNC: 4.3 MMOL/L (ref 3.6–5.5)
PROT SERPL-MCNC: 7.6 G/DL (ref 6–8.2)
RBC # BLD AUTO: 4.66 M/UL (ref 4.2–5.4)
SODIUM SERPL-SCNC: 139 MMOL/L (ref 135–145)
TRIGL SERPL-MCNC: 100 MG/DL (ref 0–149)
WBC # BLD AUTO: 7 K/UL (ref 4.8–10.8)

## 2021-04-29 PROCEDURE — 85025 COMPLETE CBC W/AUTO DIFF WBC: CPT

## 2021-04-29 PROCEDURE — 80061 LIPID PANEL: CPT

## 2021-04-29 PROCEDURE — 36415 COLL VENOUS BLD VENIPUNCTURE: CPT

## 2021-04-29 PROCEDURE — 80053 COMPREHEN METABOLIC PANEL: CPT

## 2021-04-29 PROCEDURE — 83036 HEMOGLOBIN GLYCOSYLATED A1C: CPT

## 2021-05-28 ENCOUNTER — GYNECOLOGY VISIT (OUTPATIENT)
Dept: OBGYN | Facility: CLINIC | Age: 51
End: 2021-05-28
Payer: COMMERCIAL

## 2021-05-28 ENCOUNTER — HOSPITAL ENCOUNTER (OUTPATIENT)
Facility: MEDICAL CENTER | Age: 51
End: 2021-05-28
Attending: OBSTETRICS & GYNECOLOGY
Payer: COMMERCIAL

## 2021-05-28 VITALS — BODY MASS INDEX: 26.91 KG/M2 | WEIGHT: 204 LBS | DIASTOLIC BLOOD PRESSURE: 91 MMHG | SYSTOLIC BLOOD PRESSURE: 143 MMHG

## 2021-05-28 DIAGNOSIS — N75.0 BARTHOLIN CYST: ICD-10-CM

## 2021-05-28 DIAGNOSIS — N89.8 VAGINAL DISCHARGE: ICD-10-CM

## 2021-05-28 DIAGNOSIS — N94.9 VAGINAL LUMP: ICD-10-CM

## 2021-05-28 PROCEDURE — 87186 SC STD MICRODIL/AGAR DIL: CPT

## 2021-05-28 PROCEDURE — 87070 CULTURE OTHR SPECIMN AEROBIC: CPT

## 2021-05-28 PROCEDURE — 87077 CULTURE AEROBIC IDENTIFY: CPT | Mod: 91

## 2021-05-28 PROCEDURE — 56420 I&D BARTHOLINS GLAND ABSCESS: CPT | Performed by: OBSTETRICS & GYNECOLOGY

## 2021-05-28 PROCEDURE — 87075 CULTR BACTERIA EXCEPT BLOOD: CPT

## 2021-05-28 RX ORDER — DULOXETIN HYDROCHLORIDE 60 MG/1
60 CAPSULE, DELAYED RELEASE ORAL DAILY
COMMUNITY
End: 2023-06-28

## 2021-05-28 RX ORDER — SULFAMETHOXAZOLE AND TRIMETHOPRIM 800; 160 MG/1; MG/1
1 TABLET ORAL 2 TIMES DAILY
Qty: 14 TABLET | Refills: 0 | Status: SHIPPED | OUTPATIENT
Start: 2021-05-28 | End: 2023-07-31

## 2021-05-28 RX ORDER — DICLOFENAC SODIUM 75 MG/1
75 TABLET, DELAYED RELEASE ORAL 2 TIMES DAILY
COMMUNITY
End: 2023-07-31

## 2021-05-28 ASSESSMENT — FIBROSIS 4 INDEX: FIB4 SCORE: 0.89

## 2021-05-28 NOTE — NON-PROVIDER
Pt here to discuss vaginal lump   Pt states has noticed it since last Thursday, but has started getting bigger with pain.   Good #  [phone]   Pharmacy verified.

## 2021-05-31 LAB
BACTERIA WND AEROBE CULT: ABNORMAL
BACTERIA WND AEROBE CULT: ABNORMAL
SIGNIFICANT IND 70042: ABNORMAL
SITE SITE: ABNORMAL
SOURCE SOURCE: ABNORMAL

## 2021-06-01 ENCOUNTER — TELEPHONE (OUTPATIENT)
Dept: OBGYN | Facility: CLINIC | Age: 51
End: 2021-06-01

## 2021-06-01 LAB
BACTERIA SPEC ANAEROBE CULT: NORMAL
SIGNIFICANT IND 70042: NORMAL
SITE SITE: NORMAL
SOURCE SOURCE: NORMAL

## 2021-06-01 NOTE — TELEPHONE ENCOUNTER
Patient called stating she had a cyst removed by Dr. Savage and had a catheter placed on 05/28/21 to remove recessive fluid drainage on vagina. Pt stated since than has been feeling extreme discomfort and can not sit down. Informed pt that I will consult with Doctor and get back to her. Patient next appt is 06/08/21 pt understood      Consulted with Dr. Savage per Doctor patient informed that she may take out catheter herself. Informed patient there may be fluid but that is to be expected and to not be alarmed Patient understood and will be seen in office at her next appointment.

## 2021-06-08 ENCOUNTER — GYNECOLOGY VISIT (OUTPATIENT)
Dept: OBGYN | Facility: CLINIC | Age: 51
End: 2021-06-08
Payer: COMMERCIAL

## 2021-06-08 VITALS — WEIGHT: 207 LBS | BODY MASS INDEX: 27.31 KG/M2 | DIASTOLIC BLOOD PRESSURE: 84 MMHG | SYSTOLIC BLOOD PRESSURE: 145 MMHG

## 2021-06-08 DIAGNOSIS — N75.0 BARTHOLIN CYST: ICD-10-CM

## 2021-06-08 DIAGNOSIS — Z12.39 BREAST SCREENING: ICD-10-CM

## 2021-06-08 PROCEDURE — 99213 OFFICE O/P EST LOW 20 MIN: CPT | Performed by: OBSTETRICS & GYNECOLOGY

## 2021-06-08 RX ORDER — CONJUGATED ESTROGENS 0.62 MG/G
CREAM VAGINAL
Qty: 30 G | Refills: 1 | Status: SHIPPED | OUTPATIENT
Start: 2021-06-08 | End: 2023-07-31

## 2021-06-08 ASSESSMENT — FIBROSIS 4 INDEX: FIB4 SCORE: 0.89

## 2021-06-08 NOTE — NON-PROVIDER
Pt here to follow up from bartholin cyst drainage   Pt states she took out the word cath herself. Feeling well.   Good # [phone]   Pharmacy verified.

## 2021-06-08 NOTE — PROGRESS NOTES
Chief complaint: Follow-up Bartholin's gland and drainage      History of present illness: 51 y.o. presents to office for follow-up after drainage of Bartholin's gland.  Patient reports to be feeling much better.  No pain.  No fevers.  Catheter was in place for approximately 4 to 5 days, which point she removed it.  Has finished antibiotics.    Patient was complaining of some vaginal dryness, especially during intercourse.  Wondering if there is anything that can be done for that      Review of systems:  Pertinent positives documented in HPI and a comprehensive review of system is negative    All PMH, PSH, allergies, social history and FH reviewed and updated today:  History reviewed. No pertinent past medical history.    Past Surgical History:   Procedure Laterality Date   • PELVISCOPY  7/15/2011    Performed by JERSON COYNE at [facility] DAY [facility]   • OVARIAN CYSTECTOMY  7/15/2011    Performed by JERSON COYNE at [facility]       Allergies: No Known Allergies    Social History     Socioeconomic History   • Marital status:      Spouse name: Not on file   • Number of children: Not on file   • Years of education: Not on file   • Highest education level: Not on file   Occupational History   • Not on file   Tobacco Use   • Smoking status: Never Smoker   • Smokeless tobacco: Never Used   Substance and Sexual Activity   • Alcohol use: No   • Drug use: No   • Sexual activity: Never   Other Topics Concern   • Not on file   Social History Narrative   • Not on file     Social Determinants of Health     Financial Resource Strain:    • Difficulty of Paying Living Expenses:    Food Insecurity:    • Worried About Running Out of Food in the Last Year:    • Ran Out of Food in the Last Year:    Transportation Needs:    • Lack of Transportation (Medical):    • Lack of Transportation (Non-Medical):    Physical Activity:    • Days of Exercise per Week:    • Minutes of Exercise per Session:     Stress:    • Feeling of Stress :    Social Connections:    • Frequency of Communication with Friends and Family:    • Frequency of Social Gatherings with Friends and Family:    • Attends Scientology Services:    • Active Member of Clubs or Organizations:    • Attends Club or Organization Meetings:    • Marital Status:    Intimate Partner Violence:    • Fear of Current or Ex-Partner:    • Emotionally Abused:    • Physically Abused:    • Sexually Abused:        Family History   Problem Relation Age of Onset   • Diabetes Other    • Hypertension Other        Physical exam:  /84   Wt 93.9 kg (207 lb)     GENERAL APPEARANCE: healthy, alert, no distress  ABDOMEN Abdomen soft, non-tender. BS normal. No masses,  No organomegaly  FEMALE GYN: normal female external genitalia without lesions, BUS normal, no vaginal discharge noted, vulva pink without erythema or friability, urethra is normal without discharge or scarring, no bladder fullness or masses, normal external genitalia, no erythema, no discharge, some vaginal atrophy noted, normal anus and perineum.  NEURO Awake, alert and oriented x 3, Normal gait, no sensory deficits  SKIN No rashes, or ulcers or lesions seen  PSYCHIATRIC: Patient shows appropriate affect, is alert and oriented x3, intact judgment and insight.      Assessment:  1. Breast screening  MA-SCREENING MAMMO BILAT W/TOMOSYNTHESIS W/CAD   2. Bartholin cyst         Plan:    Patient healing well from procedure.  Discussed with the patient that if cyst recurs, then remove the gland may be necessary    Mammogram ordered as the patient is overdue    Discussed with patient pros and cons of estrogen use for vaginal atrophy.  Will use daily for 3 weeks and then follow-up.  Prescription sent     Questions answered    Spent  15 minutes in face-to-face patient contact in which greater than 50% of that visit was spent in counseling/coordination of care including medical and surgical options of care.

## 2021-06-28 ENCOUNTER — PHARMACY VISIT (OUTPATIENT)
Dept: PHARMACY | Facility: MEDICAL CENTER | Age: 51
End: 2021-06-28
Payer: COMMERCIAL

## 2021-06-28 PROCEDURE — RXMED WILLOW AMBULATORY MEDICATION CHARGE: Performed by: OBSTETRICS & GYNECOLOGY

## 2021-07-07 ENCOUNTER — GYNECOLOGY VISIT (OUTPATIENT)
Dept: OBGYN | Facility: CLINIC | Age: 51
End: 2021-07-07
Payer: COMMERCIAL

## 2021-07-07 VITALS — BODY MASS INDEX: 27.05 KG/M2 | SYSTOLIC BLOOD PRESSURE: 132 MMHG | WEIGHT: 205 LBS | DIASTOLIC BLOOD PRESSURE: 80 MMHG

## 2021-07-07 DIAGNOSIS — N95.2 VAGINAL ATROPHY: ICD-10-CM

## 2021-07-07 PROCEDURE — 99213 OFFICE O/P EST LOW 20 MIN: CPT | Performed by: OBSTETRICS & GYNECOLOGY

## 2021-07-07 ASSESSMENT — FIBROSIS 4 INDEX: FIB4 SCORE: 0.89

## 2021-07-07 NOTE — NON-PROVIDER
Pt here for [facility] Bartholin cyst drainage   Pt states uncomfortable during intercourse   Good# [phone]   Pharmacy verified

## 2021-07-17 PROBLEM — N95.2 VAGINAL ATROPHY: Status: ACTIVE | Noted: 2021-07-17

## 2021-07-18 NOTE — PROGRESS NOTES
Chief complaint: Lump in vagina    History of present illness:   51 y.o.  presents with above chief complaint.  Patient reports a painful lump on her right labia.  Patient reports the area is extremely tender to palpation.  She is unable to sit down due to the pain.  No fevers      ROS: Pertinent positives documented in HPI and all other systems reviewed & are negative    POBHx:  0    PGYNHx: None    All PMH, PSH, meds, allergies, social history and FH reviewed and updated today:  No past medical history on file.    Past Surgical History:   Procedure Laterality Date   • PELVISCOPY  7/15/2011    Performed by JERSON COYNE at [facility]   • OVARIAN CYSTECTOMY  7/15/2011    Performed by JERSON COYNE at [facility]       Allergies: No Known Allergies    Social History     Socioeconomic History   • Marital status:      Spouse name: Not on file   • Number of children: Not on file   • Years of education: Not on file   • Highest education level: Not on file   Occupational History   • Not on file   Tobacco Use   • Smoking status: Never Smoker   • Smokeless tobacco: Never Used   Substance and Sexual Activity   • Alcohol use: No   • Drug use: No   • Sexual activity: Never   Other Topics Concern   • Not on file   Social History Narrative   • Not on file     Social Determinants of Health     Financial Resource Strain:    • Difficulty of Paying Living Expenses:    Food Insecurity:    • Worried About Running Out of Food in the Last Year:    • Ran Out of Food in the Last Year:    Transportation Needs:    • Lack of Transportation (Medical):    • Lack of Transportation (Non-Medical):    Physical Activity:    • Days of Exercise per Week:    • Minutes of Exercise per Session:    Stress:    • Feeling of Stress :    Social Connections:    • Frequency of Communication with Friends and Family:    • Frequency of Social Gatherings with Friends and Family:    • Attends  Mor[employer] Services:    • Active Member of Clubs or Organizations:    • Attends Club or Organization Meetings:    • Marital Status:    Intimate Partner Violence:    • Fear of Current or Ex-Partner:    • Emotionally Abused:    • Physically Abused:    • Sexually Abused:        Family History   Problem Relation Age of Onset   • Diabetes Other    • Hypertension Other        Physical exam:  /91   Wt 92.5 kg (204 lb)     GENERAL APPEARANCE: healthy, alert, no distress  ABDOMEN Abdomen soft, non-tender. BS normal. No masses,  No organomegaly  FEMALE GYN: normal female external genitalia, there is a large cyst noted on the right labia.  Area is extremely tender to palpation.  Vaginal mucosa pink and moist, no vaginal discharge noted, cervix normal in appearance without lesions, no CMT, urethra is normal without discharge or scarring, no bladder fullness or masses, normal anus and perineum. Uterus mobile, normal size, and nontender. Ovaries normal size and nontender bilaterally. No palpable masses.  No inguinal hernia present .  EXTREMITIES:negative clubbing, cyanosis, edema    NEURO Awake, alert and oriented x 3, Normal gait, no sensory deficits  SKIN No rashes, or ulcers or lesions seen  PSYCHIATRIC: Patient shows appropriate affect, is alert and oriented x3, intact judgment and insight.      Procedure:    The area is cleansed with Betadine solution and the overlying skin was anesthetized with 1% lidocaine solution.  A small incision was then made with a scalpel.  Copious amounts of mucus material noted.  No pus was seen.  The entire cyst was evacuated and a Cifuentes catheter placed.  Patient tolerated procedure well          Assessment:  1. Vaginal lump  Consent for all Surgical, Special Diagnostic or Therapeutic Procedures   2. Bartholin cyst  ANAEROBIC AND AEROBIC CULTURE       Plan:    Bartholin cyst drained.  Patient to follow-up in approximately 4 weeks.    Patient tolerated procedure well.  All questions  answered

## 2021-07-18 NOTE — PROGRESS NOTES
Chief Complaint   Patient presents with   • Gynecologic Exam     [facility] Bartholin cyst drainage      Chief complaint: Follow-up Bartholin's gland drainage    History of present illness: 51 y.o. presents to office for follow-up Bartholin's gland drainage.  Patient reports to be doing well.  Pain is completely gone.  She is now reporting some pain with intercourse.  She is try some lubricants with no improvement.      Review of systems:  Pertinent positives documented in HPI and a comprehensive review of system is negative    All PMH, PSH, allergies, social history and FH reviewed and updated today:  No past medical history on file.    Past Surgical History:   Procedure Laterality Date   • PELVISCOPY  7/15/2011    Performed by JERSON COYNE at [facility]   • OVARIAN CYSTECTOMY  7/15/2011    Performed by JERSON COYNE at [facility]       Allergies: No Known Allergies    Social History     Socioeconomic History   • Marital status:      Spouse name: Not on file   • Number of children: Not on file   • Years of education: Not on file   • Highest education level: Not on file   Occupational History   • Not on file   Tobacco Use   • Smoking status: Never Smoker   • Smokeless tobacco: Never Used   Substance and Sexual Activity   • Alcohol use: No   • Drug use: No   • Sexual activity: Never   Other Topics Concern   • Not on file   Social History Narrative   • Not on file     Social Determinants of Health     Financial Resource Strain:    • Difficulty of Paying Living Expenses:    Food Insecurity:    • Worried About Running Out of Food in the Last Year:    • Ran Out of Food in the Last Year:    Transportation Needs:    • Lack of Transportation (Medical):    • Lack of Transportation (Non-Medical):    Physical Activity:    • Days of Exercise per Week:    • Minutes of Exercise per Session:    Stress:    • Feeling of Stress :    Social Connections:    • Frequency of Communication with  Friends and Family:    • Frequency of Social Gatherings with Friends and Family:    • Attends [employer] Services:    • Active Member of Clubs or Organizations:    • Attends Club or Organization Meetings:    • Marital Status:    Intimate Partner Violence:    • Fear of Current or Ex-Partner:    • Emotionally Abused:    • Physically Abused:    • Sexually Abused:        Family History   Problem Relation Age of Onset   • Diabetes Other    • Hypertension Other        Physical exam:  /80 (BP Location: Right arm, Patient Position: Sitting, BP Cuff Size: Large adult)   Wt 93 kg (205 lb)     GENERAL APPEARANCE: healthy, alert, no distress  FEMALE GYN: normal female external genitalia without lesions, slight vaginal atrophy noted on examination.  Bimanual exam unremarkable.  No tenderness to palpation, no masses palpated   NEURO Awake, alert and oriented x 3, Normal gait, no sensory deficits  SKIN No rashes, or ulcers or lesions seen  PSYCHIATRIC: Patient shows appropriate affect, is alert and oriented x3, intact judgment and insight.      Assessment:  1. Vaginal atrophy         Plan:    Discussed findings with patient.  Plan for treatment with vaginal estrogen.  Follow-up in approximately 4 weeks.  Bartholin cyst drainage area healing well.    Questions answered    Spent 15 minutes in face-to-face patient contact in which greater than 50% of that visit was spent in counseling/coordination of care including medical and surgical options of care.

## 2021-07-29 ENCOUNTER — GYNECOLOGY VISIT (OUTPATIENT)
Dept: OBGYN | Facility: CLINIC | Age: 51
End: 2021-07-29
Payer: COMMERCIAL

## 2021-07-29 VITALS — DIASTOLIC BLOOD PRESSURE: 84 MMHG | WEIGHT: 202 LBS | BODY MASS INDEX: 26.65 KG/M2 | SYSTOLIC BLOOD PRESSURE: 134 MMHG

## 2021-07-29 DIAGNOSIS — N95.2 VAGINAL ATROPHY: ICD-10-CM

## 2021-07-29 PROCEDURE — 99213 OFFICE O/P EST LOW 20 MIN: CPT | Performed by: OBSTETRICS & GYNECOLOGY

## 2021-07-29 ASSESSMENT — FIBROSIS 4 INDEX: FIB4 SCORE: 0.89

## 2021-07-29 NOTE — PROGRESS NOTES
Chief Complaint   Patient presents with   • Gynecologic Exam     follow up    Chief complaint: Follow-up treatment      History of present illness: 51 y.o. presents to office for follow-up treatment.  Patient was started on vaginal estrogen secondary to dyspareunia.  She has been using vaginal estrogen for the last month or so.  Now using every other day    Patient reports very good results with it.  Denies any pain with intercourse.  Almost 100% improvement per her report      Review of systems:  Pertinent positives documented in HPI and a comprehensive review of system is negative    All PMH, PSH, allergies, social history and FH reviewed and updated today:  History reviewed. No pertinent past medical history.    Past Surgical History:   Procedure Laterality Date   • PELVISCOPY  7/15/2011    Performed by JERSON COYNE at [facility]   • OVARIAN CYSTECTOMY  7/15/2011    Performed by JERSON COYNE at [facility]       Allergies: No Known Allergies    Social History     Socioeconomic History   • Marital status:      Spouse name: Not on file   • Number of children: Not on file   • Years of education: Not on file   • Highest education level: Not on file   Occupational History   • Not on file   Tobacco Use   • Smoking status: Never Smoker   • Smokeless tobacco: Never Used   Substance and Sexual Activity   • Alcohol use: No   • Drug use: No   • Sexual activity: Never   Other Topics Concern   • Not on file   Social History Narrative   • Not on file     Social Determinants of Health     Financial Resource Strain:    • Difficulty of Paying Living Expenses:    Food Insecurity:    • Worried About Running Out of Food in the Last Year:    • Ran Out of Food in the Last Year:    Transportation Needs:    • Lack of Transportation (Medical):    • Lack of Transportation (Non-Medical):    Physical Activity:    • Days of Exercise per Week:    • Minutes of Exercise per Session:     Stress:    • Feeling of Stress :    Social Connections:    • Frequency of Communication with Friends and Family:    • Frequency of Social Gatherings with Friends and Family:    • Attends Holiness Services:    • Active Member of Clubs or Organizations:    • Attends Club or Organization Meetings:    • Marital Status:    Intimate Partner Violence:    • Fear of Current or Ex-Partner:    • Emotionally Abused:    • Physically Abused:    • Sexually Abused:        Family History   Problem Relation Age of Onset   • Diabetes Other    • Hypertension Other        Physical exam:  /84 (BP Location: Left arm, Patient Position: Sitting, BP Cuff Size: Adult)   Wt 91.6 kg (202 lb)     GENERAL APPEARANCE: healthy, alert, no distress  NECK nontender, no masses, thyromegaly or nodules  FEMALE GYN: normal female external genitalia without lesions, vaginal mucosa is extremely well estrogenized.  No pain during examination.  NEURO Awake, alert and oriented x 3, Normal gait, no sensory deficits  SKIN No rashes, or ulcers or lesions seen  PSYCHIATRIC: Patient shows appropriate affect, is alert and oriented x3, intact judgment and insight.      Assessment:  1. Vaginal atrophy         Plan:    Patient doing well with current regimen.  Will continue.  All questions answered.  Follow-up as needed.    Questions answered    Spent 15 minutes in face-to-face patient contact in which greater than 50% of that visit was spent in counseling/coordination of care including medical and surgical options of care.

## 2021-07-29 NOTE — NON-PROVIDER
Patient here for a GYN follow up.   Last seen on  07/07/2021 Cyst drainaige   C/o follow up today   pharmacy verified.  Patient phone #:

## 2022-01-31 ENCOUNTER — HOSPITAL ENCOUNTER (OUTPATIENT)
Dept: LAB | Facility: MEDICAL CENTER | Age: 52
End: 2022-01-31
Attending: FAMILY MEDICINE
Payer: COMMERCIAL

## 2022-01-31 LAB
ANION GAP SERPL CALC-SCNC: 15 MMOL/L (ref 7–16)
BUN SERPL-MCNC: 17 MG/DL (ref 8–22)
CALCIUM SERPL-MCNC: 9 MG/DL (ref 8.5–10.5)
CHLORIDE SERPL-SCNC: 101 MMOL/L (ref 96–112)
CO2 SERPL-SCNC: 21 MMOL/L (ref 20–33)
CREAT SERPL-MCNC: 0.53 MG/DL (ref 0.5–1.4)
GLUCOSE SERPL-MCNC: 110 MG/DL (ref 65–99)
H PYLORI AG STL QL IA: NOT DETECTED
POTASSIUM SERPL-SCNC: 3.8 MMOL/L (ref 3.6–5.5)
SODIUM SERPL-SCNC: 137 MMOL/L (ref 135–145)

## 2022-01-31 PROCEDURE — 36415 COLL VENOUS BLD VENIPUNCTURE: CPT

## 2022-01-31 PROCEDURE — 87338 HPYLORI STOOL AG IA: CPT

## 2022-01-31 PROCEDURE — 80048 BASIC METABOLIC PNL TOTAL CA: CPT

## 2022-08-26 ENCOUNTER — HOSPITAL ENCOUNTER (OUTPATIENT)
Dept: LAB | Facility: MEDICAL CENTER | Age: 52
End: 2022-08-26
Attending: FAMILY MEDICINE
Payer: COMMERCIAL

## 2022-08-26 LAB
ALBUMIN SERPL BCP-MCNC: 4.3 G/DL (ref 3.2–4.9)
ALBUMIN/GLOB SERPL: 1.7 G/DL
ALP SERPL-CCNC: 93 U/L (ref 30–99)
ALT SERPL-CCNC: 30 U/L (ref 2–50)
ANION GAP SERPL CALC-SCNC: 12 MMOL/L (ref 7–16)
AST SERPL-CCNC: 31 U/L (ref 12–45)
BASOPHILS # BLD AUTO: 0.3 % (ref 0–1.8)
BASOPHILS # BLD: 0.02 K/UL (ref 0–0.12)
BILIRUB SERPL-MCNC: 0.2 MG/DL (ref 0.1–1.5)
BUN SERPL-MCNC: 17 MG/DL (ref 8–22)
CALCIUM SERPL-MCNC: 8.5 MG/DL (ref 8.5–10.5)
CHLORIDE SERPL-SCNC: 103 MMOL/L (ref 96–112)
CO2 SERPL-SCNC: 21 MMOL/L (ref 20–33)
CREAT SERPL-MCNC: 0.49 MG/DL (ref 0.5–1.4)
EOSINOPHIL # BLD AUTO: 0.1 K/UL (ref 0–0.51)
EOSINOPHIL NFR BLD: 1.5 % (ref 0–6.9)
ERYTHROCYTE [DISTWIDTH] IN BLOOD BY AUTOMATED COUNT: 45.6 FL (ref 35.9–50)
GFR SERPLBLD CREATININE-BSD FMLA CKD-EPI: 114 ML/MIN/1.73 M 2
GLOBULIN SER CALC-MCNC: 2.6 G/DL (ref 1.9–3.5)
GLUCOSE SERPL-MCNC: 138 MG/DL (ref 65–99)
HCT VFR BLD AUTO: 39.3 % (ref 37–47)
HGB BLD-MCNC: 13.4 G/DL (ref 12–16)
IMM GRANULOCYTES # BLD AUTO: 0.03 K/UL (ref 0–0.11)
IMM GRANULOCYTES NFR BLD AUTO: 0.5 % (ref 0–0.9)
LYMPHOCYTES # BLD AUTO: 1.97 K/UL (ref 1–4.8)
LYMPHOCYTES NFR BLD: 30.4 % (ref 22–41)
MCH RBC QN AUTO: 31.2 PG (ref 27–33)
MCHC RBC AUTO-ENTMCNC: 34.1 G/DL (ref 33.6–35)
MCV RBC AUTO: 91.4 FL (ref 81.4–97.8)
MONOCYTES # BLD AUTO: 0.72 K/UL (ref 0–0.85)
MONOCYTES NFR BLD AUTO: 11.1 % (ref 0–13.4)
NEUTROPHILS # BLD AUTO: 3.65 K/UL (ref 2–7.15)
NEUTROPHILS NFR BLD: 56.2 % (ref 44–72)
NRBC # BLD AUTO: 0 K/UL
NRBC BLD-RTO: 0 /100 WBC
PLATELET # BLD AUTO: 207 K/UL (ref 164–446)
PMV BLD AUTO: 10.2 FL (ref 9–12.9)
POTASSIUM SERPL-SCNC: 4.1 MMOL/L (ref 3.6–5.5)
PROT SERPL-MCNC: 6.9 G/DL (ref 6–8.2)
RBC # BLD AUTO: 4.3 M/UL (ref 4.2–5.4)
SODIUM SERPL-SCNC: 136 MMOL/L (ref 135–145)
WBC # BLD AUTO: 6.5 K/UL (ref 4.8–10.8)

## 2022-08-26 PROCEDURE — 85025 COMPLETE CBC W/AUTO DIFF WBC: CPT

## 2022-08-26 PROCEDURE — 80053 COMPREHEN METABOLIC PANEL: CPT

## 2022-08-26 PROCEDURE — 36415 COLL VENOUS BLD VENIPUNCTURE: CPT

## 2022-11-14 ENCOUNTER — HOSPITAL ENCOUNTER (OUTPATIENT)
Dept: LAB | Facility: MEDICAL CENTER | Age: 52
End: 2022-11-14
Attending: FAMILY MEDICINE
Payer: COMMERCIAL

## 2022-11-14 LAB
ALBUMIN SERPL BCP-MCNC: 4.5 G/DL (ref 3.2–4.9)
ALBUMIN/GLOB SERPL: 1.7 G/DL
ALP SERPL-CCNC: 104 U/L (ref 30–99)
ALT SERPL-CCNC: 32 U/L (ref 2–50)
ANION GAP SERPL CALC-SCNC: 9 MMOL/L (ref 7–16)
AST SERPL-CCNC: 31 U/L (ref 12–45)
BASOPHILS # BLD AUTO: 0.3 % (ref 0–1.8)
BASOPHILS # BLD: 0.02 K/UL (ref 0–0.12)
BILIRUB SERPL-MCNC: 0.4 MG/DL (ref 0.1–1.5)
BUN SERPL-MCNC: 17 MG/DL (ref 8–22)
CALCIUM SERPL-MCNC: 9.1 MG/DL (ref 8.5–10.5)
CHLORIDE SERPL-SCNC: 102 MMOL/L (ref 96–112)
CO2 SERPL-SCNC: 25 MMOL/L (ref 20–33)
CREAT SERPL-MCNC: 0.53 MG/DL (ref 0.5–1.4)
EOSINOPHIL # BLD AUTO: 0.13 K/UL (ref 0–0.51)
EOSINOPHIL NFR BLD: 2.1 % (ref 0–6.9)
ERYTHROCYTE [DISTWIDTH] IN BLOOD BY AUTOMATED COUNT: 42 FL (ref 35.9–50)
GFR SERPLBLD CREATININE-BSD FMLA CKD-EPI: 112 ML/MIN/1.73 M 2
GLOBULIN SER CALC-MCNC: 2.7 G/DL (ref 1.9–3.5)
GLUCOSE SERPL-MCNC: 168 MG/DL (ref 65–99)
HCT VFR BLD AUTO: 43.5 % (ref 37–47)
HGB BLD-MCNC: 14.5 G/DL (ref 12–16)
IMM GRANULOCYTES # BLD AUTO: 0.03 K/UL (ref 0–0.11)
IMM GRANULOCYTES NFR BLD AUTO: 0.5 % (ref 0–0.9)
LYMPHOCYTES # BLD AUTO: 2.31 K/UL (ref 1–4.8)
LYMPHOCYTES NFR BLD: 36.7 % (ref 22–41)
MCH RBC QN AUTO: 30.8 PG (ref 27–33)
MCHC RBC AUTO-ENTMCNC: 33.3 G/DL (ref 33.6–35)
MCV RBC AUTO: 92.4 FL (ref 81.4–97.8)
MONOCYTES # BLD AUTO: 0.49 K/UL (ref 0–0.85)
MONOCYTES NFR BLD AUTO: 7.8 % (ref 0–13.4)
NEUTROPHILS # BLD AUTO: 3.32 K/UL (ref 2–7.15)
NEUTROPHILS NFR BLD: 52.6 % (ref 44–72)
NRBC # BLD AUTO: 0 K/UL
NRBC BLD-RTO: 0 /100 WBC
PLATELET # BLD AUTO: 227 K/UL (ref 164–446)
PMV BLD AUTO: 10.1 FL (ref 9–12.9)
POTASSIUM SERPL-SCNC: 4 MMOL/L (ref 3.6–5.5)
PROT SERPL-MCNC: 7.2 G/DL (ref 6–8.2)
RBC # BLD AUTO: 4.71 M/UL (ref 4.2–5.4)
SODIUM SERPL-SCNC: 136 MMOL/L (ref 135–145)
WBC # BLD AUTO: 6.3 K/UL (ref 4.8–10.8)

## 2022-11-14 PROCEDURE — 36415 COLL VENOUS BLD VENIPUNCTURE: CPT

## 2022-11-14 PROCEDURE — 85025 COMPLETE CBC W/AUTO DIFF WBC: CPT

## 2022-11-14 PROCEDURE — 80053 COMPREHEN METABOLIC PANEL: CPT

## 2023-01-30 ENCOUNTER — APPOINTMENT (OUTPATIENT)
Dept: LAB | Facility: MEDICAL CENTER | Age: 53
End: 2023-01-30
Payer: COMMERCIAL

## 2023-02-06 ENCOUNTER — HOSPITAL ENCOUNTER (OUTPATIENT)
Dept: LAB | Facility: MEDICAL CENTER | Age: 53
End: 2023-02-06
Attending: NURSE PRACTITIONER
Payer: COMMERCIAL

## 2023-02-06 PROCEDURE — 83013 H PYLORI (C-13) BREATH: CPT

## 2023-02-08 LAB — UREA BREATH TEST QL: NEGATIVE

## 2023-03-03 ENCOUNTER — HOSPITAL ENCOUNTER (OUTPATIENT)
Dept: LAB | Facility: MEDICAL CENTER | Age: 53
End: 2023-03-03
Attending: INTERNAL MEDICINE
Payer: COMMERCIAL

## 2023-03-03 LAB
ALBUMIN SERPL BCP-MCNC: 4.4 G/DL (ref 3.2–4.9)
ALBUMIN/GLOB SERPL: 1.6 G/DL
ALP SERPL-CCNC: 114 U/L (ref 30–99)
ALT SERPL-CCNC: 11 U/L (ref 2–50)
ANION GAP SERPL CALC-SCNC: 10 MMOL/L (ref 7–16)
AST SERPL-CCNC: 16 U/L (ref 12–45)
BILIRUB SERPL-MCNC: 0.3 MG/DL (ref 0.1–1.5)
BUN SERPL-MCNC: 20 MG/DL (ref 8–22)
CALCIUM ALBUM COR SERPL-MCNC: 8.8 MG/DL (ref 8.5–10.5)
CALCIUM SERPL-MCNC: 9.1 MG/DL (ref 8.5–10.5)
CHLORIDE SERPL-SCNC: 107 MMOL/L (ref 96–112)
CO2 SERPL-SCNC: 23 MMOL/L (ref 20–33)
CREAT SERPL-MCNC: 0.63 MG/DL (ref 0.5–1.4)
GFR SERPLBLD CREATININE-BSD FMLA CKD-EPI: 107 ML/MIN/1.73 M 2
GLOBULIN SER CALC-MCNC: 2.8 G/DL (ref 1.9–3.5)
GLUCOSE SERPL-MCNC: 100 MG/DL (ref 65–99)
POTASSIUM SERPL-SCNC: 4.1 MMOL/L (ref 3.6–5.5)
PROT SERPL-MCNC: 7.2 G/DL (ref 6–8.2)
SODIUM SERPL-SCNC: 140 MMOL/L (ref 135–145)

## 2023-03-03 PROCEDURE — 80053 COMPREHEN METABOLIC PANEL: CPT

## 2023-03-03 PROCEDURE — 36415 COLL VENOUS BLD VENIPUNCTURE: CPT

## 2023-06-14 ENCOUNTER — HOSPITAL ENCOUNTER (OUTPATIENT)
Dept: RADIOLOGY | Facility: MEDICAL CENTER | Age: 53
End: 2023-06-14
Attending: INTERNAL MEDICINE
Payer: COMMERCIAL

## 2023-06-14 PROBLEM — R10.11 RIGHT UPPER QUADRANT PAIN: Status: ACTIVE | Noted: 2023-06-14

## 2023-06-14 PROBLEM — K76.0 FATTY LIVER: Status: ACTIVE | Noted: 2023-06-14

## 2023-06-14 PROBLEM — R74.8 ELEVATED ALKALINE PHOSPHATASE LEVEL: Status: ACTIVE | Noted: 2023-06-14

## 2023-06-14 NOTE — H&P
Subjective:   6/28/2023  1:12 PM  Primary care physician: Nomi Santizo M.D.  Referring Provider: Ceasar Dunbar DO    Chief Complaint: Biliary dyskinesia    Diagnosis:   1. Right upper quadrant pain        2. Fatty liver        3. Elevated alkaline phosphatase level        4. Biliary dyskinesia            History of presenting illness:  Sangeetha Coates is a pleasant 53 y.o. female who presented to Dr. Dunbar for right upper quadrant pain. She was noted to have an alk phos of 114. She subsequently underwent a HIDA scan that demonstrated decreased gallbladder ejection fraction of 6% suggesting gallbladder dyskinesia.     She presents today for surgical evaluation.  She states she is having intermittent right upper quadrant pain.  She does have some associated nausea.  The pain does radiate around to the right side of her back.  She is able to eat but does have associated nausea sometimes.  She has no vomiting.  She is otherwise having regular bowel movements.  She denies any fevers or chills or new onset jaundice.    No past medical history on file.  Past Surgical History:   Procedure Laterality Date    PELVISCOPY  7/15/2011    Performed by JERSON COYNE at SURGERY SAME DAY [facility]    OVARIAN CYSTECTOMY  7/15/2011    Performed by JERSON COYNE at SURGERY SAME DAY [facility] OR[facility]     No Known Allergies  Outpatient Encounter Medications as of 6/28/2023   Medication Sig Dispense Refill    Omeprazole Magnesium 20.6 (20 Base) MG CAPSULE DELAYED RELEASE Take 1 tablet by mouth every day. 30 capsule 1    diclofenac DR (VOLTAREN) 75 MG Tablet Delayed Response Take 75 mg by mouth 2 times a day.      estrogens, conjugated (PREMARIN) 0.625 MG/GM Cream Insert 0.5 grams vaginally once daily in the evening. 30 g 1    estrogens, conjugated (PREMARIN) 0.625 MG/GM Cream Use 0.5 g vaginally daily at night 30 g 1    sulfamethoxazole-trimethoprim (BACTRIM DS) 800-160 MG tablet Take 1 tablet by mouth 2 times a day. 14  tablet 0    [DISCONTINUED] DULoxetine (CYMBALTA) 60 MG Cap DR Particles delayed-release capsule Take 60 mg by mouth every day. (Patient not taking: Reported on 6/28/2023)      oseltamivir (TAMIFLU) 75 MG Cap Take 1 Cap by mouth 2 times a day. 10 Cap 0    [DISCONTINUED] acetaminophen (TYLENOL) 500 MG Tab Take 500-1,000 mg by mouth every 6 hours as needed. (Patient not taking: Reported on 6/28/2023)      [DISCONTINUED] ibuprofen (MOTRIN) 200 MG Tab Take 200 mg by mouth every 6 hours as needed.      omeprazole (PRILOSEC) 40 MG delayed-release capsule Take 1 Cap by mouth every day. 90 Cap 2    amoxicillin-clavulanate (AUGMENTIN) 875-125 MG Tab Take 1 Tab by mouth 2 times a day. (Patient not taking: Reported on 6/28/2023) 14 Tab 0    medroxyPROGESTERone (PROVERA) 10 MG Tab Take 1 Tab by mouth every day. 10 Tab 6    progesterone (PROMETRIUM) 200 MG capsule Take 1 Cap by mouth every day. 30 Cap 11    MICONAZOLE NITRATE VAGINAL (MONISTAT 3) 4 % Cream 1 applicatorful per vagina HS x 3 days 1 Tube 2    progesterone (PROMETRIUM) 200 MG capsule Take 1 Cap by mouth every day. 10 Cap 11    meclizine (ANTIVERT) 25 MG Tab Take 1 Tab by mouth 4 times a day as needed for Dizziness. 20 Tab 0    naproxen (NAPROSYN) 500 MG Tab Take 1 Tab by mouth 2 times a day as needed. 30 Tab 0    omeprazole (PRILOSEC) 40 MG delayed-release capsule Take 1 Cap by mouth every day. 30 Cap 2    [DISCONTINUED] Omega-3 Fatty Acids (FISH OIL) 1000 MG Cap capsule Take 1,000 mg by mouth 3 times a day, with meals.       No facility-administered encounter medications on file as of 6/28/2023.     Social History     Socioeconomic History    Marital status:      Spouse name: Not on file    Number of children: Not on file    Years of education: Not on file    Highest education level: Not on file   Occupational History    Not on file   Tobacco Use    Smoking status: Never    Smokeless tobacco: Never   Vaping Use    Vaping Use: Never used   Substance and Sexual  Activity    Alcohol use: No    Drug use: No    Sexual activity: Never   Other Topics Concern    Not on file   Social History Narrative    Not on file     Social Determinants of Health     Financial Resource Strain: Not on file   Food Insecurity: Not on file   Transportation Needs: Not on file   Physical Activity: Not on file   Stress: Not on file   Social Connections: Not on file   Intimate Partner Violence: Not on file   Housing Stability: Not on file      Social History     Tobacco Use   Smoking Status Never   Smokeless Tobacco Never   Vaping Use    Vaping Use: Never used     Social History     Substance and Sexual Activity   Alcohol Use No     Social History     Substance and Sexual Activity   Drug Use No      Family History   Problem Relation Age of Onset    Diabetes Other     Hypertension Other          Review of Systems   Constitutional:  Negative for chills, fever, malaise/fatigue and weight loss.   HENT:  Negative for congestion, ear discharge, ear pain, hearing loss and nosebleeds.    Eyes:  Negative for blurred vision, double vision, photophobia, pain and discharge.   Respiratory:  Negative for cough, hemoptysis and sputum production.    Cardiovascular:  Negative for chest pain, palpitations, orthopnea and claudication.   Gastrointestinal:  Positive for abdominal pain, heartburn and nausea. Negative for constipation, diarrhea and vomiting.   Genitourinary:  Negative for dysuria, frequency, hematuria and urgency.   Musculoskeletal:  Negative for back pain, joint pain, myalgias and neck pain.   Skin:  Negative for itching and rash.   Neurological:  Negative for dizziness, tingling, tremors, sensory change, speech change, focal weakness and headaches.   Endo/Heme/Allergies:  Negative for environmental allergies. Does not bruise/bleed easily.   Psychiatric/Behavioral:  Negative for depression, hallucinations, substance abuse and suicidal ideas. The patient is not nervous/anxious.         Objective:   /68  "(BP Location: Right arm, Patient Position: Sitting, BP Cuff Size: Adult)   Pulse 87   Temp 36.7 °C (98.1 °F) (Temporal)   Ht 1.6 m (5' 3\")   Wt 93 kg (205 lb)   SpO2 95%   BMI 36.31 kg/m²     Physical Exam  Constitutional:       General: She is not in acute distress.  HENT:      Head: Normocephalic and atraumatic.   Eyes:      General: No scleral icterus.  Cardiovascular:      Rate and Rhythm: Normal rate and regular rhythm.   Pulmonary:      Effort: No respiratory distress.   Abdominal:      Palpations: Abdomen is soft.      Comments: Mildly tender to deep palpation in the right upper quadrant.  Otherwise nontender throughout.  No guarding or rebound.   Musculoskeletal:      Cervical back: Normal range of motion.   Neurological:      Mental Status: She is alert.         Labs  Lab Results   Component Value Date/Time    WBC 6.3 11/14/2022 12:08 PM    RBC 4.71 11/14/2022 12:08 PM    HEMOGLOBIN 14.5 11/14/2022 12:08 PM    HEMATOCRIT 43.5 11/14/2022 12:08 PM    MCV 92.4 11/14/2022 12:08 PM    MCH 30.8 11/14/2022 12:08 PM    MCHC 33.3 (L) 11/14/2022 12:08 PM    MPV 10.1 11/14/2022 12:08 PM    NEUTSPOLYS 52.60 11/14/2022 12:08 PM    LYMPHOCYTES 36.70 11/14/2022 12:08 PM    MONOCYTES 7.80 11/14/2022 12:08 PM    EOSINOPHILS 2.10 11/14/2022 12:08 PM    BASOPHILS 0.30 11/14/2022 12:08 PM      Component Ref Range & Units 3 mo ago 7 mo ago 9 mo ago 1 yr ago 2 yr ago 3 yr ago 4 yr ago   Sodium 135 - 145 mmol/L 140  136  136  137  139  137  140    Potassium 3.6 - 5.5 mmol/L 4.1  4.0  4.1  3.8  4.3  4.3  3.9    Chloride 96 - 112 mmol/L 107  102  103  101  104  103  107    Co2 20 - 33 mmol/L 23  25  21  21  24  26  24    Anion Gap 7.0 - 16.0 10.0  9.0  12.0  15.0  11.0  8.0 R  9.0 R    Glucose 65 - 99 mg/dL 100 High   168 High   138 High   110 High   105 High   85  103 High     Bun 8 - 22 mg/dL 20  17  17  17  20  20  15    Creatinine 0.50 - 1.40 mg/dL 0.63  0.53  0.49 Low   0.53  0.59  0.62  0.51    Calcium 8.5 - 10.5 mg/dL " 9.1  9.1  8.5  9.0  8.9  9.3  9.0    AST(SGOT) 12 - 45 U/L 16  31  31   17  15  16    ALT(SGPT) 2 - 50 U/L 11  32  30   17  15  17    Alkaline Phosphatase 30 - 99 U/L 114 High   104 High   93   96  75  88    Total Bilirubin 0.1 - 1.5 mg/dL 0.3  0.4  0.2   0.6  0.6  0.6    Albumin 3.2 - 4.9 g/dL 4.4  4.5  4.3   4.5  4.4  4.2    Total Protein 6.0 - 8.2 g/dL 7.2  7.2  6.9   7.6  7.5  6.9    Globulin 1.9 - 3.5 g/dL 2.8  2.7  2.6   3.1  3.1  2.7    A-G Ratio g/dL 1.6  1.7  1.7   1.5        Imaging  HIDA SCAN 4/4/23      Pathology  None    Procedures  None    Diagnosis:     1. Right upper quadrant pain        2. Fatty liver        3. Elevated alkaline phosphatase level        4. Biliary dyskinesia            Medical Decision Making:  Today's Assessment / Status / Plan:     53-year-old female who presents for evaluation of biliary dyskinesia.  She has ongoing right upper quadrant discomfort rating around to her back with intermittent nausea.  Her ejection fraction on her HIDA scan is 6%.  All of this fits with biliary dyskinesia and likely is related to the symptoms she is having.    I discussed with her the physiology of this disease as well as its management.  I recommended she undergo a robotic cholecystectomy.  The risk benefits and alternatives were explained in detail to the patient including but not limited to bleeding, infection, postoperative pain, wound healing issues, bile duct injury, biliary leak, VTE, MI, stroke and a small risk of death.  She understood all these reassessment and good questions all of which were answered and she is in agreement to proceed.    We will schedule her for surgery in the next few weeks.  My office will contact her with the date.      The patient asked several great questions which were answered to their satisfaction.  They  are in agreement with the care plan as outlined above.    I, Bernardino Story MD have entered, reviewed and confirmed the above diagnosis related to this  patient on this date of service, June 28, 2023       Bernardino Story MD  Surgical Oncology

## 2023-06-28 ENCOUNTER — OFFICE VISIT (OUTPATIENT)
Dept: SURGICAL ONCOLOGY | Facility: MEDICAL CENTER | Age: 53
End: 2023-06-28
Payer: COMMERCIAL

## 2023-06-28 VITALS
WEIGHT: 205 LBS | SYSTOLIC BLOOD PRESSURE: 120 MMHG | BODY MASS INDEX: 36.32 KG/M2 | OXYGEN SATURATION: 95 % | HEART RATE: 87 BPM | HEIGHT: 63 IN | DIASTOLIC BLOOD PRESSURE: 68 MMHG | TEMPERATURE: 98.1 F

## 2023-06-28 DIAGNOSIS — K76.0 FATTY LIVER: ICD-10-CM

## 2023-06-28 DIAGNOSIS — K82.8 BILIARY DYSKINESIA: ICD-10-CM

## 2023-06-28 DIAGNOSIS — R74.8 ELEVATED ALKALINE PHOSPHATASE LEVEL: ICD-10-CM

## 2023-06-28 DIAGNOSIS — R10.11 RIGHT UPPER QUADRANT PAIN: ICD-10-CM

## 2023-06-28 PROCEDURE — 3078F DIAST BP <80 MM HG: CPT | Performed by: SURGERY

## 2023-06-28 PROCEDURE — 99204 OFFICE O/P NEW MOD 45 MIN: CPT | Performed by: SURGERY

## 2023-06-28 PROCEDURE — 3074F SYST BP LT 130 MM HG: CPT | Performed by: SURGERY

## 2023-06-28 ASSESSMENT — FIBROSIS 4 INDEX: FIB4 SCORE: 1.08

## 2023-06-30 ASSESSMENT — ENCOUNTER SYMPTOMS
ABDOMINAL PAIN: 1
TINGLING: 0
EYE DISCHARGE: 0
NECK PAIN: 0
FOCAL WEAKNESS: 0
SPEECH CHANGE: 0
PALPITATIONS: 0
FEVER: 0
CLAUDICATION: 0
HEMOPTYSIS: 0
HEADACHES: 0
HEARTBURN: 1
HALLUCINATIONS: 0
ORTHOPNEA: 0
WEIGHT LOSS: 0
PHOTOPHOBIA: 0
MYALGIAS: 0
BRUISES/BLEEDS EASILY: 0
DEPRESSION: 0
CHILLS: 0
NAUSEA: 1
DOUBLE VISION: 0
SPUTUM PRODUCTION: 0
BACK PAIN: 0
BLURRED VISION: 0
EYE PAIN: 0
COUGH: 0
VOMITING: 0
DIARRHEA: 0
NERVOUS/ANXIOUS: 0
DIZZINESS: 0
TREMORS: 0
CONSTIPATION: 0
SENSORY CHANGE: 0

## 2023-06-30 ASSESSMENT — LIFESTYLE VARIABLES: SUBSTANCE_ABUSE: 0

## 2023-07-10 ENCOUNTER — TELEPHONE (OUTPATIENT)
Dept: SURGICAL ONCOLOGY | Facility: MEDICAL CENTER | Age: 53
End: 2023-07-10
Payer: COMMERCIAL

## 2023-07-10 NOTE — TELEPHONE ENCOUNTER
LVM for patient to CB to schedule surgery with Dr. Story, this is the second attempt, other number on file is disconnected.

## 2023-07-24 ENCOUNTER — PRE-ADMISSION TESTING (OUTPATIENT)
Dept: ADMISSIONS | Facility: MEDICAL CENTER | Age: 53
End: 2023-07-24
Attending: SURGERY
Payer: COMMERCIAL

## 2023-07-28 ENCOUNTER — APPOINTMENT (OUTPATIENT)
Dept: ADMISSIONS | Facility: MEDICAL CENTER | Age: 53
End: 2023-07-28
Attending: SURGERY
Payer: COMMERCIAL

## 2023-07-31 ENCOUNTER — PRE-ADMISSION TESTING (OUTPATIENT)
Dept: ADMISSIONS | Facility: MEDICAL CENTER | Age: 53
End: 2023-07-31
Attending: SURGERY
Payer: COMMERCIAL

## 2023-07-31 DIAGNOSIS — Z01.812 PRE-OPERATIVE LABORATORY EXAMINATION: ICD-10-CM

## 2023-07-31 DIAGNOSIS — Z01.810 PRE-OPERATIVE CARDIOVASCULAR EXAMINATION: ICD-10-CM

## 2023-07-31 RX ORDER — LOSARTAN POTASSIUM 25 MG/1
25 TABLET ORAL DAILY
COMMUNITY
Start: 2023-05-26

## 2023-08-02 ENCOUNTER — PRE-ADMISSION TESTING (OUTPATIENT)
Dept: ADMISSIONS | Facility: MEDICAL CENTER | Age: 53
End: 2023-08-02
Attending: SURGERY
Payer: COMMERCIAL

## 2023-08-02 DIAGNOSIS — Z01.812 PRE-OPERATIVE LABORATORY EXAMINATION: ICD-10-CM

## 2023-08-02 DIAGNOSIS — Z01.810 PRE-OPERATIVE CARDIOVASCULAR EXAMINATION: ICD-10-CM

## 2023-08-02 LAB
ANION GAP SERPL CALC-SCNC: 12 MMOL/L (ref 7–16)
BUN SERPL-MCNC: 18 MG/DL (ref 8–22)
CALCIUM SERPL-MCNC: 9 MG/DL (ref 8.5–10.5)
CHLORIDE SERPL-SCNC: 106 MMOL/L (ref 96–112)
CO2 SERPL-SCNC: 21 MMOL/L (ref 20–33)
CREAT SERPL-MCNC: 0.59 MG/DL (ref 0.5–1.4)
EKG IMPRESSION: NORMAL
ERYTHROCYTE [DISTWIDTH] IN BLOOD BY AUTOMATED COUNT: 42.6 FL (ref 35.9–50)
GFR SERPLBLD CREATININE-BSD FMLA CKD-EPI: 109 ML/MIN/1.73 M 2
GLUCOSE SERPL-MCNC: 106 MG/DL (ref 65–99)
HCT VFR BLD AUTO: 40.7 % (ref 37–47)
HGB BLD-MCNC: 14 G/DL (ref 12–16)
MCH RBC QN AUTO: 30.8 PG (ref 27–33)
MCHC RBC AUTO-ENTMCNC: 34.4 G/DL (ref 32.2–35.5)
MCV RBC AUTO: 89.6 FL (ref 81.4–97.8)
PLATELET # BLD AUTO: 229 K/UL (ref 164–446)
PMV BLD AUTO: 10.1 FL (ref 9–12.9)
POTASSIUM SERPL-SCNC: 4.1 MMOL/L (ref 3.6–5.5)
RBC # BLD AUTO: 4.54 M/UL (ref 4.2–5.4)
SODIUM SERPL-SCNC: 139 MMOL/L (ref 135–145)
WBC # BLD AUTO: 6.5 K/UL (ref 4.8–10.8)

## 2023-08-02 PROCEDURE — 85027 COMPLETE CBC AUTOMATED: CPT

## 2023-08-02 PROCEDURE — 80048 BASIC METABOLIC PNL TOTAL CA: CPT

## 2023-08-02 PROCEDURE — 93010 ELECTROCARDIOGRAM REPORT: CPT | Performed by: INTERNAL MEDICINE

## 2023-08-02 PROCEDURE — 36415 COLL VENOUS BLD VENIPUNCTURE: CPT

## 2023-08-02 PROCEDURE — 93005 ELECTROCARDIOGRAM TRACING: CPT

## 2023-08-07 ENCOUNTER — ANESTHESIA EVENT (OUTPATIENT)
Dept: SURGERY | Facility: MEDICAL CENTER | Age: 53
End: 2023-08-07
Payer: COMMERCIAL

## 2023-08-07 ENCOUNTER — ANESTHESIA (OUTPATIENT)
Dept: SURGERY | Facility: MEDICAL CENTER | Age: 53
End: 2023-08-07
Payer: COMMERCIAL

## 2023-08-07 ENCOUNTER — HOSPITAL ENCOUNTER (OUTPATIENT)
Facility: MEDICAL CENTER | Age: 53
End: 2023-08-07
Attending: SURGERY | Admitting: SURGERY
Payer: COMMERCIAL

## 2023-08-07 VITALS
HEART RATE: 71 BPM | HEIGHT: 65 IN | TEMPERATURE: 96.8 F | OXYGEN SATURATION: 91 % | BODY MASS INDEX: 33.79 KG/M2 | WEIGHT: 202.82 LBS | SYSTOLIC BLOOD PRESSURE: 125 MMHG | RESPIRATION RATE: 16 BRPM | DIASTOLIC BLOOD PRESSURE: 69 MMHG

## 2023-08-07 DIAGNOSIS — G89.18 POST-OP PAIN: ICD-10-CM

## 2023-08-07 PROBLEM — I10 PRIMARY HYPERTENSION: Status: ACTIVE | Noted: 2023-08-07

## 2023-08-07 PROBLEM — K82.8 BILIARY DYSKINESIA: Status: ACTIVE | Noted: 2023-08-07

## 2023-08-07 LAB — PATHOLOGY CONSULT NOTE: NORMAL

## 2023-08-07 PROCEDURE — 160048 HCHG OR STATISTICAL LEVEL 1-5: Performed by: SURGERY

## 2023-08-07 PROCEDURE — 700102 HCHG RX REV CODE 250 W/ 637 OVERRIDE(OP): Performed by: ANESTHESIOLOGY

## 2023-08-07 PROCEDURE — 160025 RECOVERY II MINUTES (STATS): Performed by: SURGERY

## 2023-08-07 PROCEDURE — 700101 HCHG RX REV CODE 250: Performed by: ANESTHESIOLOGY

## 2023-08-07 PROCEDURE — 160035 HCHG PACU - 1ST 60 MINS PHASE I: Performed by: SURGERY

## 2023-08-07 PROCEDURE — 47562 LAPAROSCOPIC CHOLECYSTECTOMY: CPT | Performed by: SURGERY

## 2023-08-07 PROCEDURE — 700104 HCHG RX REV CODE 254: Performed by: SURGERY

## 2023-08-07 PROCEDURE — 700105 HCHG RX REV CODE 258: Mod: JZ | Performed by: SURGERY

## 2023-08-07 PROCEDURE — 160046 HCHG PACU - 1ST 60 MINS PHASE II: Performed by: SURGERY

## 2023-08-07 PROCEDURE — 700111 HCHG RX REV CODE 636 W/ 250 OVERRIDE (IP): Mod: JZ | Performed by: ANESTHESIOLOGY

## 2023-08-07 PROCEDURE — 502714 HCHG ROBOTIC SURGERY SERVICES: Performed by: SURGERY

## 2023-08-07 PROCEDURE — A9270 NON-COVERED ITEM OR SERVICE: HCPCS | Performed by: ANESTHESIOLOGY

## 2023-08-07 PROCEDURE — 700111 HCHG RX REV CODE 636 W/ 250 OVERRIDE (IP): Performed by: ANESTHESIOLOGY

## 2023-08-07 PROCEDURE — 88304 TISSUE EXAM BY PATHOLOGIST: CPT

## 2023-08-07 PROCEDURE — 160031 HCHG SURGERY MINUTES - 1ST 30 MINS LEVEL 5: Performed by: SURGERY

## 2023-08-07 PROCEDURE — 160036 HCHG PACU - EA ADDL 30 MINS PHASE I: Performed by: SURGERY

## 2023-08-07 PROCEDURE — 700101 HCHG RX REV CODE 250: Performed by: SURGERY

## 2023-08-07 PROCEDURE — 160002 HCHG RECOVERY MINUTES (STAT): Performed by: SURGERY

## 2023-08-07 PROCEDURE — 160042 HCHG SURGERY MINUTES - EA ADDL 1 MIN LEVEL 5: Performed by: SURGERY

## 2023-08-07 PROCEDURE — 47562 LAPAROSCOPIC CHOLECYSTECTOMY: CPT | Mod: AS | Performed by: SPECIALIST

## 2023-08-07 PROCEDURE — 160047 HCHG PACU  - EA ADDL 30 MINS PHASE II: Performed by: SURGERY

## 2023-08-07 PROCEDURE — 160009 HCHG ANES TIME/MIN: Performed by: SURGERY

## 2023-08-07 RX ORDER — TRAMADOL HYDROCHLORIDE 50 MG/1
50 TABLET ORAL EVERY 4 HOURS PRN
Qty: 30 TABLET | Refills: 0 | Status: SHIPPED | OUTPATIENT
Start: 2023-08-07 | End: 2023-08-14

## 2023-08-07 RX ORDER — ONDANSETRON 2 MG/ML
INJECTION INTRAMUSCULAR; INTRAVENOUS PRN
Status: DISCONTINUED | OUTPATIENT
Start: 2023-08-07 | End: 2023-08-07 | Stop reason: SURG

## 2023-08-07 RX ORDER — ROCURONIUM BROMIDE 10 MG/ML
INJECTION, SOLUTION INTRAVENOUS PRN
Status: DISCONTINUED | OUTPATIENT
Start: 2023-08-07 | End: 2023-08-07 | Stop reason: SURG

## 2023-08-07 RX ORDER — HALOPERIDOL 5 MG/ML
1 INJECTION INTRAMUSCULAR
Status: DISCONTINUED | OUTPATIENT
Start: 2023-08-07 | End: 2023-08-07 | Stop reason: HOSPADM

## 2023-08-07 RX ORDER — GABAPENTIN 300 MG/1
300 CAPSULE ORAL ONCE
Status: COMPLETED | OUTPATIENT
Start: 2023-08-07 | End: 2023-08-07

## 2023-08-07 RX ORDER — ACETAMINOPHEN 500 MG
1000 TABLET ORAL ONCE
Status: COMPLETED | OUTPATIENT
Start: 2023-08-07 | End: 2023-08-07

## 2023-08-07 RX ORDER — ONDANSETRON 2 MG/ML
4 INJECTION INTRAMUSCULAR; INTRAVENOUS
Status: COMPLETED | OUTPATIENT
Start: 2023-08-07 | End: 2023-08-07

## 2023-08-07 RX ORDER — OXYCODONE HCL 5 MG/5 ML
5 SOLUTION, ORAL ORAL
Status: COMPLETED | OUTPATIENT
Start: 2023-08-07 | End: 2023-08-07

## 2023-08-07 RX ORDER — INDOCYANINE GREEN AND WATER 25 MG
1.5 KIT INJECTION ONCE
Status: COMPLETED | OUTPATIENT
Start: 2023-08-07 | End: 2023-08-07

## 2023-08-07 RX ORDER — CELECOXIB 200 MG/1
400 CAPSULE ORAL ONCE
Status: COMPLETED | OUTPATIENT
Start: 2023-08-07 | End: 2023-08-07

## 2023-08-07 RX ORDER — HYDROMORPHONE HYDROCHLORIDE 1 MG/ML
0.4 INJECTION, SOLUTION INTRAMUSCULAR; INTRAVENOUS; SUBCUTANEOUS
Status: DISCONTINUED | OUTPATIENT
Start: 2023-08-07 | End: 2023-08-07 | Stop reason: HOSPADM

## 2023-08-07 RX ORDER — OXYCODONE HCL 5 MG/5 ML
10 SOLUTION, ORAL ORAL
Status: COMPLETED | OUTPATIENT
Start: 2023-08-07 | End: 2023-08-07

## 2023-08-07 RX ORDER — CEFAZOLIN SODIUM 1 G/3ML
INJECTION, POWDER, FOR SOLUTION INTRAMUSCULAR; INTRAVENOUS PRN
Status: DISCONTINUED | OUTPATIENT
Start: 2023-08-07 | End: 2023-08-07 | Stop reason: SURG

## 2023-08-07 RX ORDER — BUPIVACAINE HYDROCHLORIDE AND EPINEPHRINE 5; 5 MG/ML; UG/ML
INJECTION, SOLUTION EPIDURAL; INTRACAUDAL; PERINEURAL
Status: DISCONTINUED | OUTPATIENT
Start: 2023-08-07 | End: 2023-08-07 | Stop reason: HOSPADM

## 2023-08-07 RX ORDER — HYDROMORPHONE HYDROCHLORIDE 2 MG/ML
INJECTION, SOLUTION INTRAMUSCULAR; INTRAVENOUS; SUBCUTANEOUS PRN
Status: DISCONTINUED | OUTPATIENT
Start: 2023-08-07 | End: 2023-08-07 | Stop reason: SURG

## 2023-08-07 RX ORDER — SODIUM CHLORIDE, SODIUM LACTATE, POTASSIUM CHLORIDE, CALCIUM CHLORIDE 600; 310; 30; 20 MG/100ML; MG/100ML; MG/100ML; MG/100ML
INJECTION, SOLUTION INTRAVENOUS CONTINUOUS
Status: ACTIVE | OUTPATIENT
Start: 2023-08-07 | End: 2023-08-07

## 2023-08-07 RX ORDER — HYDROMORPHONE HYDROCHLORIDE 1 MG/ML
0.1 INJECTION, SOLUTION INTRAMUSCULAR; INTRAVENOUS; SUBCUTANEOUS
Status: DISCONTINUED | OUTPATIENT
Start: 2023-08-07 | End: 2023-08-07 | Stop reason: HOSPADM

## 2023-08-07 RX ORDER — DEXAMETHASONE SODIUM PHOSPHATE 4 MG/ML
INJECTION, SOLUTION INTRA-ARTICULAR; INTRALESIONAL; INTRAMUSCULAR; INTRAVENOUS; SOFT TISSUE PRN
Status: DISCONTINUED | OUTPATIENT
Start: 2023-08-07 | End: 2023-08-07 | Stop reason: SURG

## 2023-08-07 RX ORDER — HYDROMORPHONE HYDROCHLORIDE 1 MG/ML
0.2 INJECTION, SOLUTION INTRAMUSCULAR; INTRAVENOUS; SUBCUTANEOUS
Status: DISCONTINUED | OUTPATIENT
Start: 2023-08-07 | End: 2023-08-07 | Stop reason: HOSPADM

## 2023-08-07 RX ORDER — LIDOCAINE HYDROCHLORIDE 20 MG/ML
INJECTION, SOLUTION EPIDURAL; INFILTRATION; INTRACAUDAL; PERINEURAL PRN
Status: DISCONTINUED | OUTPATIENT
Start: 2023-08-07 | End: 2023-08-07 | Stop reason: SURG

## 2023-08-07 RX ORDER — MEPERIDINE HYDROCHLORIDE 25 MG/ML
12.5 INJECTION INTRAMUSCULAR; INTRAVENOUS; SUBCUTANEOUS
Status: DISCONTINUED | OUTPATIENT
Start: 2023-08-07 | End: 2023-08-07 | Stop reason: HOSPADM

## 2023-08-07 RX ORDER — MIDAZOLAM HYDROCHLORIDE 1 MG/ML
INJECTION INTRAMUSCULAR; INTRAVENOUS PRN
Status: DISCONTINUED | OUTPATIENT
Start: 2023-08-07 | End: 2023-08-07 | Stop reason: SURG

## 2023-08-07 RX ORDER — SODIUM CHLORIDE, SODIUM LACTATE, POTASSIUM CHLORIDE, CALCIUM CHLORIDE 600; 310; 30; 20 MG/100ML; MG/100ML; MG/100ML; MG/100ML
INJECTION, SOLUTION INTRAVENOUS CONTINUOUS
Status: DISCONTINUED | OUTPATIENT
Start: 2023-08-07 | End: 2023-08-07 | Stop reason: HOSPADM

## 2023-08-07 RX ADMIN — SODIUM CHLORIDE, POTASSIUM CHLORIDE, SODIUM LACTATE AND CALCIUM CHLORIDE: 600; 310; 30; 20 INJECTION, SOLUTION INTRAVENOUS at 06:45

## 2023-08-07 RX ADMIN — ROCURONIUM BROMIDE 30 MG: 50 INJECTION, SOLUTION INTRAVENOUS at 07:08

## 2023-08-07 RX ADMIN — GABAPENTIN 300 MG: 300 CAPSULE ORAL at 06:39

## 2023-08-07 RX ADMIN — PROPOFOL 120 MG: 10 INJECTION, EMULSION INTRAVENOUS at 07:08

## 2023-08-07 RX ADMIN — HYDROMORPHONE HYDROCHLORIDE 0.5 MG: 2 INJECTION INTRAMUSCULAR; INTRAVENOUS; SUBCUTANEOUS at 08:32

## 2023-08-07 RX ADMIN — ROCURONIUM BROMIDE 10 MG: 50 INJECTION, SOLUTION INTRAVENOUS at 08:04

## 2023-08-07 RX ADMIN — ONDANSETRON 4 MG: 2 INJECTION INTRAMUSCULAR; INTRAVENOUS at 11:09

## 2023-08-07 RX ADMIN — OXYCODONE HYDROCHLORIDE 10 MG: 5 SOLUTION ORAL at 09:05

## 2023-08-07 RX ADMIN — DEXAMETHASONE SODIUM PHOSPHATE 8 MG: 4 INJECTION INTRA-ARTICULAR; INTRALESIONAL; INTRAMUSCULAR; INTRAVENOUS; SOFT TISSUE at 07:08

## 2023-08-07 RX ADMIN — FENTANYL CITRATE 150 MCG: 50 INJECTION, SOLUTION INTRAMUSCULAR; INTRAVENOUS at 07:08

## 2023-08-07 RX ADMIN — PROPOFOL 30 MG: 10 INJECTION, EMULSION INTRAVENOUS at 08:25

## 2023-08-07 RX ADMIN — ACETAMINOPHEN 1000 MG: 500 TABLET, FILM COATED ORAL at 06:39

## 2023-08-07 RX ADMIN — CEFAZOLIN 2 G: 1 INJECTION, POWDER, FOR SOLUTION INTRAMUSCULAR; INTRAVENOUS at 07:08

## 2023-08-07 RX ADMIN — SUGAMMADEX 200 MG: 100 INJECTION, SOLUTION INTRAVENOUS at 08:19

## 2023-08-07 RX ADMIN — FENTANYL CITRATE 100 MCG: 50 INJECTION, SOLUTION INTRAMUSCULAR; INTRAVENOUS at 07:40

## 2023-08-07 RX ADMIN — ONDANSETRON 4 MG: 2 INJECTION INTRAMUSCULAR; INTRAVENOUS at 08:14

## 2023-08-07 RX ADMIN — HYDROMORPHONE HYDROCHLORIDE 0.5 MG: 2 INJECTION INTRAMUSCULAR; INTRAVENOUS; SUBCUTANEOUS at 08:20

## 2023-08-07 RX ADMIN — CELECOXIB 400 MG: 200 CAPSULE ORAL at 06:39

## 2023-08-07 RX ADMIN — HYDROMORPHONE HYDROCHLORIDE 0.5 MG: 2 INJECTION INTRAMUSCULAR; INTRAVENOUS; SUBCUTANEOUS at 08:04

## 2023-08-07 RX ADMIN — INDOCYANINE GREEN AND WATER 1.5 MG: KIT at 06:40

## 2023-08-07 RX ADMIN — LIDOCAINE HYDROCHLORIDE 70 MG: 20 INJECTION, SOLUTION EPIDURAL; INFILTRATION; INTRACAUDAL at 07:08

## 2023-08-07 RX ADMIN — ROCURONIUM BROMIDE 10 MG: 50 INJECTION, SOLUTION INTRAVENOUS at 07:40

## 2023-08-07 RX ADMIN — MIDAZOLAM 2 MG: 1 INJECTION, SOLUTION INTRAMUSCULAR; INTRAVENOUS at 07:03

## 2023-08-07 ASSESSMENT — ENCOUNTER SYMPTOMS
DIZZINESS: 0
BLURRED VISION: 0
NECK PAIN: 0
EYE PAIN: 0
NERVOUS/ANXIOUS: 0
SPEECH CHANGE: 0
COUGH: 0
SENSORY CHANGE: 0
EYE DISCHARGE: 0
TREMORS: 0
SPUTUM PRODUCTION: 0
DEPRESSION: 0
VOMITING: 0
HEADACHES: 0
ORTHOPNEA: 0
FEVER: 0
DOUBLE VISION: 0
FOCAL WEAKNESS: 0
PALPITATIONS: 0
MYALGIAS: 0
DIARRHEA: 0
NAUSEA: 1
HALLUCINATIONS: 0
CLAUDICATION: 0
HEARTBURN: 1
BACK PAIN: 0
ABDOMINAL PAIN: 1
HEMOPTYSIS: 0
WEIGHT LOSS: 0
BRUISES/BLEEDS EASILY: 0
TINGLING: 0
CHILLS: 0
CONSTIPATION: 0
PHOTOPHOBIA: 0

## 2023-08-07 ASSESSMENT — FIBROSIS 4 INDEX: FIB4 SCORE: 1.07

## 2023-08-07 ASSESSMENT — PAIN SCALES - GENERAL: PAIN_LEVEL: 4

## 2023-08-07 ASSESSMENT — PAIN DESCRIPTION - PAIN TYPE: TYPE: SURGICAL PAIN

## 2023-08-07 ASSESSMENT — LIFESTYLE VARIABLES: SUBSTANCE_ABUSE: 0

## 2023-08-07 NOTE — ANESTHESIA PROCEDURE NOTES
Airway    Date/Time: 8/7/2023 7:11 AM    Performed by: Melissa Terry M.D.  Authorized by: Melissa Terry M.D.    Location:  OR  Urgency:  Elective  Indications for Airway Management:  Anesthesia      Spontaneous Ventilation: absent    Sedation Level:  Deep  Preoxygenated: Yes    Patient Position:  Sniffing  Mask Difficulty Assessment:  1 - vent by mask  Final Airway Type:  Endotracheal airway  Final Endotracheal Airway:  ETT  Cuffed: Yes    Technique Used for Successful ETT Placement:  Video laryngoscopy  Devices/Methods Used in Placement:  Intubating stylet    Insertion Site:  Oral  Blade Type:  Silver  Laryngoscope Blade/Videolaryngoscope Blade Size:  3  ETT Size (mm):  7.0  Measured from:  Teeth  ETT to Teeth (cm):  21  Placement Verified by: capnometry and palpation of cuff    Cormack-Lehane Classification:  Grade I - full view of glottis  Number of Attempts at Approach:  1

## 2023-08-07 NOTE — ANESTHESIA POSTPROCEDURE EVALUATION
Patient: Sangeetha Coates    Procedure Summary     Date: 08/07/23 Room / Location: [place] [address] / SURGERY [facility]    Anesthesia Start: 0700 Anesthesia Stop: 0836    Procedure: ROBOTIC CHOLECYSTECTOMY (Abdomen) Diagnosis: (BILIARY DYSKINESIA)    Surgeons: Bernardino Story M.D. Responsible Provider: Melissa Terry M.D.    Anesthesia Type: general ASA Status: 2          Final Anesthesia Type: general  Last vitals  BP   Blood Pressure: 135/74    Temp   36.4 °C (97.5 °F)    Pulse   74   Resp   16    SpO2   96 %      Anesthesia Post Evaluation    Patient location during evaluation: PACU  Patient participation: complete - patient participated  Level of consciousness: awake  Pain score: 4    Airway patency: patent  Anesthetic complications: no  Cardiovascular status: hemodynamically stable  Respiratory status: acceptable  Hydration status: acceptable    PONV: none          No notable events documented.

## 2023-08-07 NOTE — ANESTHESIA PREPROCEDURE EVALUATION
Case: [id number] Date/Time: 08/07/23 0700    Procedure: ROBOTIC CHOLECYSTECTOMY POSSIBLE OPEN.    Pre-op diagnosis: BILIARY DYSKINESIA    Location: [facility] OR 11 / SURGERY [facility]    Surgeons: Bernardino Story M.D.        52 yo w/biliary dyskinesia.   utilized.    Relevant Problems   ANESTHESIA (within normal limits)      CARDIAC   (positive) Primary hypertension         (positive) Fatty liver     Denies CAD, CP/SOB, CVA, DM, LUNG DZ, GERD, URI    Physical Exam    Airway   Mallampati: III  TM distance: <3 FB  Neck ROM: full       Cardiovascular   Rhythm: regular  Rate: normal  (-) murmur     Dental - normal exam           Pulmonary   Breath sounds clear to auscultation     Abdominal    Neurological - normal exam                 Anesthesia Plan    ASA 2       Plan - general       Airway plan will be ETT          Induction: intravenous    Postoperative Plan: Postoperative administration of opioids is intended.    Pertinent diagnostic labs and testing reviewed    Informed Consent:    Anesthetic plan and risks discussed with patient.    Use of blood products discussed with: patient whom consented to blood products.

## 2023-08-07 NOTE — PROGRESS NOTES
Subjective:   8/23/2023  9:58 AM  Primary care physician: Nomi Santizo M.D.  Referring Provider: Ceasar Dunbar DO      Chief Complaint: Post-op follow up   Diagnosis:   1. Biliary dyskinesia        2. S/P laparoscopic cholecystectomy        3. Right upper quadrant pain            History of presenting illness:    Sangeetha Coates is a pleasant 53 y.o. female who presented to Dr. Dunbar for right upper quadrant pain. She was noted to have an alk phos of 114. She subsequently underwent a HIDA scan that demonstrated decreased gallbladder ejection fraction of 6% suggesting gallbladder dyskinesia.      She presents today for surgical evaluation.  She states she is having intermittent right upper quadrant pain.  She does have some associated nausea.  The pain does radiate around to the right side of her back.  She is able to eat but does have associated nausea sometimes.  She has no vomiting.  She is otherwise having regular bowel movements.  She denies any fevers or chills or new onset jaundice.     8/7/2023:  Presents today for surgery, all questions answered.  No new changes to her health since she was last seen in clinic.    8/23/23:  She subsequently underwent a robotic cholecystectomy on 8/7/23. Pathology results demonstrated focal cholesterolosis, negative for malignancy.       She presents for a two week post operative examination.  Doing well in general, still some pain in the right side and back.  Tolerating diet, has some constipation.      Past Medical History:   Diagnosis Date    Pneumonia     x2    Psychiatric problem     anxiety and depression     Past Surgical History:   Procedure Laterality Date    CHOLECYSTECTOMY ROBOTIC XI N/A 8/7/2023    Procedure: ROBOTIC CHOLECYSTECTOMY;  Surgeon: Bernardino Story M.D.;  Location: SURGERY [facility];  Service: Gen Robotic    PELVISCOPY  7/15/2011    Performed by JERSON COYNE at SURGERY SAME DAY [facility]    OVARIAN CYSTECTOMY  7/15/2011     Performed by JERSON COYNE at SURGERY SAME DAY [facility]     No Known Allergies  Outpatient Encounter Medications as of 2023   Medication Sig Dispense Refill    [] traMADol (ULTRAM) 50 MG Tab Take 1 Tablet by mouth every four hours as needed for Moderate Pain or Severe Pain for up to 7 days. 30 Tablet 0    losartan (COZAAR) 25 MG Tab Take 25 mg by mouth every day.      omeprazole (PRILOSEC) 40 MG delayed-release capsule Take 1 Cap by mouth every day. 30 Cap 2    [DISCONTINUED] lidocaine (Xylocaine) 1 % injection 0.5 mL       [DISCONTINUED] ceFAZolin (Ancef) injection       [DISCONTINUED] midazolam (Versed) injection       [DISCONTINUED] fentaNYL (Sublimaze) injection       [DISCONTINUED] lidocaine PF (Xylocaine-MPF) 2 % injection PF       [DISCONTINUED] propofol (DIPRIVAN) injection       [DISCONTINUED] rocuronium (Zemuron) injection       [DISCONTINUED] dexamethasone (Decadron) injection       [DISCONTINUED] bupivacaine-0.5%-epinephrine 1:131412 PF (Marcaine/Sensorcaine) injection       [DISCONTINUED] HYDROmorphone (Dilaudid) injection       [DISCONTINUED] ondansetron (Zofran) syringe/vial injection       [DISCONTINUED] sugammadex (Bridion) injection        No facility-administered encounter medications on file as of 2023.     Social History     Socioeconomic History    Marital status:      Spouse name: Not on file    Number of children: Not on file    Years of education: Not on file    Highest education level: Not on file   Occupational History    Not on file   Tobacco Use    Smoking status: Never    Smokeless tobacco: Never   Vaping Use    Vaping Use: Never used   Substance and Sexual Activity    Alcohol use: No    Drug use: No    Sexual activity: Never   Other Topics Concern    Not on file   Social History Narrative    Not on file     Social Determinants of Health     Financial Resource Strain: Not on file   Food Insecurity: Not on file   Transportation Needs: Not on file    Physical Activity: Not on file   Stress: Not on file   Social Connections: Not on file   Intimate Partner Violence: Not on file   Housing Stability: Not on file      Social History     Tobacco Use   Smoking Status Never   Smokeless Tobacco Never     Social History     Substance and Sexual Activity   Alcohol Use No     Social History     Substance and Sexual Activity   Drug Use No      Family History   Problem Relation Age of Onset    Diabetes Other     Hypertension Other          Review of Systems   Constitutional:  Negative for chills, fever, malaise/fatigue and weight loss.   HENT:  Negative for congestion, ear discharge, ear pain, hearing loss and nosebleeds.    Eyes:  Negative for blurred vision, double vision, photophobia, pain and discharge.   Respiratory:  Negative for cough, hemoptysis and sputum production.    Cardiovascular:  Negative for chest pain, palpitations, orthopnea and claudication.   Gastrointestinal:  Positive for abdominal pain. Negative for constipation, diarrhea, heartburn, nausea and vomiting.   Genitourinary:  Negative for dysuria, frequency, hematuria and urgency.   Musculoskeletal:  Negative for back pain, joint pain, myalgias and neck pain.   Skin:  Negative for itching and rash.   Neurological:  Negative for dizziness, tingling, tremors, sensory change, speech change, focal weakness and headaches.   Endo/Heme/Allergies:  Negative for environmental allergies. Does not bruise/bleed easily.   Psychiatric/Behavioral:  Negative for depression, hallucinations, substance abuse and suicidal ideas. The patient is nervous/anxious.         Objective:   /74 (BP Location: Right arm, Patient Position: Sitting, BP Cuff Size: Adult)   Pulse 92   Temp 36.7 °C (98.1 °F) (Temporal)   Wt 89.8 kg (198 lb)   SpO2 95%   BMI 32.95 kg/m²     Physical Exam  Constitutional:       General: She is not in acute distress.  HENT:      Head: Normocephalic and atraumatic.   Eyes:      General: No scleral  icterus.  Cardiovascular:      Rate and Rhythm: Normal rate and regular rhythm.   Pulmonary:      Effort: No respiratory distress.   Abdominal:      Palpations: Abdomen is soft.      Comments: Well healed surgical incisions   Musculoskeletal:      Cervical back: Normal range of motion.   Neurological:      Mental Status: She is alert.         Labs   Latest Reference Range & Units Most Recent   WBC 4.8 - 10.8 K/uL 6.5  8/2/23 14:37   RBC 4.20 - 5.40 M/uL 4.54  8/2/23 14:37   Hemoglobin 12.0 - 16.0 g/dL 14.0  8/2/23 14:37   Hematocrit 37.0 - 47.0 % 40.7  8/2/23 14:37   MCV 81.4 - 97.8 fL 89.6  8/2/23 14:37   MCH 27.0 - 33.0 pg 30.8  8/2/23 14:37   MCHC 32.2 - 35.5 g/dL 34.4  8/2/23 14:37   RDW 35.9 - 50.0 fL 42.6  8/2/23 14:37   Platelet Count 164 - 446 K/uL 229  8/2/23 14:37       Latest Reference Range & Units Most Recent   Sodium 135 - 145 mmol/L 139  8/2/23 14:37   Potassium 3.6 - 5.5 mmol/L 4.1  8/2/23 14:37   Chloride 96 - 112 mmol/L 106  8/2/23 14:37   Co2 20 - 33 mmol/L 21  8/2/23 14:37   Anion Gap 7.0 - 16.0  12.0  8/2/23 14:37   Glucose 65 - 99 mg/dL 106 (H)  8/2/23 14:37   Bun 8 - 22 mg/dL 18  8/2/23 14:37   Creatinine 0.50 - 1.40 mg/dL 0.59  8/2/23 14:37   GFR (CKD-EPI) >60 mL/min/1.73 m 2 109  8/2/23 14:37   (H): Data is abnormally high    Imaging  HIDA SCAN 4/4/23         Pathology  8/7/23 SURGICAL PATHOLOGY CONSULTATION   FINAL DIAGNOSIS:   A. Gallbladder:          Focal cholesterolosis, negative for malignancy       Procedures  8/7/23 Robotic cholecystectomy      Diagnosis:     1. Biliary dyskinesia        2. S/P laparoscopic cholecystectomy        3. Right upper quadrant pain            Medical Decision Making:  Today's Assessment / Status / Plan:     53-year-old female with biliary dyskinesia status post robotic cholecystectomy.  She is here for her first postoperative appointment.  In general she is doing pretty well.  Her appetite is improving.  She is becoming more active.  She is having  right-sided pain radiating to her back that seems to be improving.  She is having some constipation as well.    We will prescribe her a bowel regimen and some antianxiety medication for the next week and see if this improves her symptoms and I will plan to see her back in 2 weeks for a follow-up appointment.      The patient asked several great questions which were answered to their satisfaction.  They  are in agreement with the care plan as outlined above.    I, Bernardino Story MD have entered, reviewed and confirmed the above diagnosis related to this patient on this date of service, August 23, 2023      Bernardino Story MD  Surgical Oncology

## 2023-08-07 NOTE — OP REPORT
Date of Operation: 8/7/2023    Preoperative Diagnosis: Biliary dyskinesia     Postoperative Diagnosis: Same    Operative Procedure:    Robotic cholecystectomy    Intent of Operation:  Curative      Wound class: Clean contaminated    Surgeon: Bernardino Story MD    Assistant: Mercedes Gonzalez PA-C. The indication for a surgical assistant in this surgery were indicated due to the complexity of the procedure.  Their role included aiding in the incision, retraction, holding of devices including cameras for laparoscopic procedures and closure of wounds.      Anesthesia: General     Estimated Blood Loss: 5 cc    Specimens: Gallbladder    Findings: Chronically inflamed gallbladder.  ICG was used to clearly delineate the common bile duct.  The critical view of safety was obtained.       Counts: Sponge, needle, and instrument counts were reported correct at the conclusion of the operation x2.       Indication: 53-year-old female with a history of hypertension who was having ongoing right upper quadrant pain.  She underwent an extensive work-up and ultimately a HIDA scan which did show a ejection fraction of 6% consistent with biliary dyskinesia.  She had ongoing symptoms.  I discussed risk-benefit and alternatives of robotic cholecystectomy with her in the office and she was in agreement to proceed.    DESCRIPTION OF PROCEDURE   Patient brought to the operating room laid in supine position underwent general tracheal anesthesia.  Arms were tucked at the side.  Pressure points were padded Venodyne's were placed on lower extremity.  She received preoperative antibiotics.  The abdomen was prepped and draped in normal sterile fashion.  Timeout was performed.    A 8 mm incision was made inferior to the umbilicus like this.  Soft tissue was dissected down using blunt dissection.  The umbilical stalk was grasped with a clamp.  Veress needle was inserted with the appropriate number of clicks and a confirmatory saline drop test.   Abdomen was then insufflated to 15 mmHg.  5 mm trocar was inserted through the infraumbilical port site under using Optiview technique.  Next the area of entry was inspected with no evidence of injury.  Next two 8 mm trocars were placed in the right lateral abdomen and 1 in the left lateral abdomen.  The 5 mm trocar was then exchanged for an 8 mm trocar.  This point the robot was docked.    Started to the right upper quadrant.  Next the fundus of the gallbladder was grasped using the left most robotic instrument and retracted cranially over the liver.  This exposed the neck of the gallbladder and the cystic duct.  Using blunt dissection the peritoneal attachments over the gallbladder were dissected free medially and laterally clearly exposing the cystic duct.  Dissection was continued until the duct was freed of all attachments and clearly skeletonized.  Just medial to this the cystic artery was clearly identified and encircled and freed of all surrounding tissue.  At this point the critical view of safety had been obtained with 2 tubular structures clearly going into the gallbladder saying liver through the triangle.  Firefly was then used to clearly delineate the cystic duct and the common bile duct could clearly be seen well away from the area of surgery.  Next Weck clips were placed on the cystic duct 2 on the staying side 1 ongoing side and the duct was transected.  Again Weck clips were placed on the cystic artery 2 on the staying side 1 on the going side and the cystic artery was transected.  The neck of the gallbladder was then retracted cranially and the gallbladder was dissected free from the gallbladder fossa.  The liver bed was inspected and hemostasis was obtained using electrocautery.  The gallbladder was freely dissected from the liver bed of the liver the bed of the liver was then inspected and hemostasis was obtained.  There was no evidence of bleeding.  Next the gallbladder was placed in a 5 mm  Endo Catch bag.  The remainder of the abdomen was inspected with no evidence of abnormality.  The robot was then undocked and the trocars were removed.  The gallbladder was removed through the umbilical trocar site after widening it slightly to allow for the Endo Catch bag to be removed through this incision.  A 0 Vicryl suture was used to close the fascia of the umbilical trocar site.  The remainder trocar sites were closed with 4-0 Monocryl and Dermabond.     The patient was then awakened and transferred to PACU in stable condition.     Disposition: Discharge home when stable in PACU.    Bernardino Story MD  August 7, 2023, 8:42 AM

## 2023-08-07 NOTE — OR NURSING
PACU note- Respirations easy. Four op sites clean and dry with Dermabond. No bleeding, no hematoma. Pain med with good effect.  Denies nausea in PACU.  Report to Phase 2.

## 2023-08-07 NOTE — DISCHARGE INSTRUCTIONS
Surgery Discharge Instructions    Sangeetha Coates   Age: 53 y.o.   : 1970    Follow-up:    Please follow-up on  @ 10 AM in clinic with Dr. Story  Please call clinic to confirm appointment [phone]  Please keep all follow-up appointments    During this admission you have been treated for:  BILIARY DYSKINESIA  Patient Active Problem List    Diagnosis Date Noted    Primary hypertension 2023    Right upper quadrant pain 2023    Fatty liver 2023    Elevated alkaline phosphatase level 2023    Vaginal atrophy 2021    Bartholin cyst 2021       Activity:   Resume light to normal activity as tolerated.  (ie - ok to walk, climb stairs, ect)  Do not engage in strenuous activity for 7-10 days.   Do not lift more than 10 pounds for the next 4-6 weeks.   It is important you are up walking several times a day to decrease the risk of a blood clot     Diet:  Resume regular diet as tolerated. To reduce risk of post-operative nausea and vomiting avoid spicy or greasy foods; eat small, frequent meals and maintain adequate fluid intake.    Medication(s):   Current Discharge Medication List        CONTINUE these medications which have NOT CHANGED    Details   losartan (COZAAR) 25 MG Tab Take 25 mg by mouth every day.      omeprazole (PRILOSEC) 40 MG delayed-release capsule Take 1 Cap by mouth every day.  Qty: 30 Cap, Refills: 2    Associated Diagnoses: History of ulcer disease            See prescription(s); take as directed.   You do NOT have to take all of your prescription pain medication.  Take only as needed if pain is not controlled with over-the-counter medications (ex: Tylenol, ibuprofen).    If you are taking narcotic pain medications be sure to take a stool softener (available over the counter - ex: miralax, magnesium citrate, dulcolax/bisacoidyl) to reduce risk of constipation.  Additionally, make sure that you are taking in enough fluids.  Do NOT drive or make any  important decision while taking prescription pain medication.    Wound Care:    Your incision has been covered with sutures. The sutures will gradually dissolve and fall out.  Do not pull on any sutures.  Do NOT apply any creams, lotions, etc to the incision unless you have been specifically instructed to do so by your surgical team.    It is normal to have a small amount of clear/yellow/pink drainage from you incision as it heals.    OK to shower and wash gently with soap and water  Please call the surgical clinic if you have:              - increasing drainage from incision              - change in the color of drainage from you incision              - redness more than 1 fingerbreadth (1 centimeter) from the incision edge   - increasing pain   - fever more than 101F  Avoid exposing your incision to the sun    Bathing/Showering:    Please shower daily starting in 24 hours. You may wash over incisions with regular soap and water and pat dry.  Ok to allow water from the shower to run over you incision.  Avoid submerging you incision under water (no baths, swimming or hot tubs) until fully healed.      Reducing post discharge nausea or vomiting:    Limit mobility, take slow, deep breaths.    Restrictions:   No smoking  No alcohol while taking prescription pain medications  No strenuous activity until cleared by surgery clinic  No heavy lifting (more than 10 lbs) for at least the next 4-6 weeks  No driving while taking prescription pain medication  No driving until you have the mobility to be a safe     Additional Instructions:   If you experience chest pain or shortness of breath, or have an acute emergency - please call 911    Please call clinic or seek evaluation at the ER if you have any of the following:  - Fever >101 F      - Wound infection (increasing redness, swelling, drainage, pus)     - Severe pain not controlled with oral medications     - Severe nausea or vomiting, inability to tolerate PO intake      - Bleeding that does not stop with holding pressure to the area       - Yellowing of the skin or eyes     - Change in mental status (confusion or lethargy)      - New numbness or weakness      - Any other concerns.      HOME CARE INSTRUCTIONS    ACTIVITY: Rest and take it easy for the first 24 hours.  A responsible adult is recommended to remain with you during that time.  It is normal to feel sleepy.  We encourage you to not do anything that requires balance, judgment or coordination.    FOR 24 HOURS DO NOT:  Drive, operate machinery or run household appliances.  Drink beer or alcoholic beverages.  Make important decisions or sign legal documents.    DIET: To avoid nausea, slowly advance diet as tolerated, avoiding spicy or greasy foods for the first day.  Add more substantial food to your diet according to your physician's instructions.  INCREASE FLUIDS AND FIBER TO AVOID CONSTIPATION.    MEDICATIONS: Resume taking daily medication.  Take prescribed pain medication with food.  If no medication is prescribed, you may take non-aspirin pain medication if needed.  PAIN MEDICATION CAN BE VERY CONSTIPATING.  Take a stool softener or laxative such as senokot, pericolace, or milk of magnesia if needed.    Prescription given for tramadol (ultram).  Last pain medication given at 9:05 am.      MILD FLU-LIKE SYMPTOMS ARE NORMAL.  YOU MAY EXPERIENCE GENERALIZED MUSCLE ACHES, THROAT IRRITATION, HEADACHE AND/OR SOME NAUSEA.    If any questions arise, call your doctor.  If your doctor is not available, please feel free to call the Surgical Center at [phone].  The Center is open Monday through Friday from 7AM to 7PM.      A registered nurse may call you a few days after your surgery to see how you are doing after your procedure.    You may also receive a survey in the mail within the next two weeks and we ask that you take a few moments to complete the survey and return it to us.  Our goal is to provide you with very good  care and we value your comments.     Depression / Suicide Risk    As you are discharged from this [facility] facility, it is important to learn how to keep safe from harming yourself.    Recognize the warning signs:  Abrupt changes in personality, positive or negative- including increase in energy   Giving away possessions  Change in eating patterns- significant weight changes-  positive or negative  Change in sleeping patterns- unable to sleep or sleeping all the time   Unwillingness or inability to communicate  Depression  Unusual sadness, discouragement and loneliness  Talk of wanting to die  Neglect of personal appearance   Rebelliousness- reckless behavior  Withdrawal from people/activities they love  Confusion- inability to concentrate     If you or a loved one observes any of these behaviors or has concerns about self-harm, here's what you can do:  Talk about it- your feelings and reasons for harming yourself  Remove any means that you might use to hurt yourself (examples: pills, rope, extension cords, firearm)  Get professional help from the community (Mental Health, Substance Abuse, psychological counseling)  Do not be alone:Call your Safe Contact- someone whom you trust who will be there for you.  Call your local CRISIS HOTLINE 662-6585 or 782-530-5155  Call your local Children's Mobile Crisis Response Team [place] (076) 561-2676 or www.Rayn  Call the toll free National Suicide Prevention Hotlines   National Suicide Prevention Lifeline 350-773-YWTT (8547)  National Hope Line Network 800-SUICIDE (829-9945)    I acknowledge receipt and understanding of these Home Care instructions.

## 2023-08-07 NOTE — ANESTHESIA TIME REPORT
Anesthesia Start and Stop Event Times     Date Time Event    8/7/2023 0645 Ready for Procedure     0700 Anesthesia Start     0836 Anesthesia Stop        Responsible Staff  08/07/23    Name Role Begin End    Melissa Terry M.D. Anesth 0700 0836        Overtime Reason:  no overtime (within assigned shift)    Comments:

## 2023-08-07 NOTE — OR NURSING
1049: Arrived from PACU AXO, Pt's VSS; patient actively vomiting upon arrival to Phase II; lap sites CDI to abdomen.     1100: Patient receiving fluids and resting. 4 mg of Zofran given.    1115: Dr. Story at bedside. Patient states she is feeling a little better. Still resting.    1130: Patient states ready to d/c home; given instructions to get dressed. D/c instructions reviewed with patient via  [employer] [id number]. No further questions.    1215: Patient d/c'd via w/c.

## 2023-08-07 NOTE — H&P
Subjective:   8/7/2023  Primary care physician: Nomi Santizo M.D.  Referring Provider: Ceasar Dunbar DO    Chief Complaint: Biliary dyskinesia    Diagnosis:   Biliary dyskinesia      History of presenting illness:  Sangeetha Coates is a pleasant 53 y.o. female who presented to Dr. Dunbar for right upper quadrant pain. She was noted to have an alk phos of 114. She subsequently underwent a HIDA scan that demonstrated decreased gallbladder ejection fraction of 6% suggesting gallbladder dyskinesia.     She presents today for surgical evaluation.  She states she is having intermittent right upper quadrant pain.  She does have some associated nausea.  The pain does radiate around to the right side of her back.  She is able to eat but does have associated nausea sometimes.  She has no vomiting.  She is otherwise having regular bowel movements.  She denies any fevers or chills or new onset jaundice.    8/7/2023:  Presents today for surgery, all questions answered.  No new changes to her health since she was last seen in clinic.    Past Medical History:   Diagnosis Date    Pneumonia     x2    Psychiatric problem     anxiety and depression     Past Surgical History:   Procedure Laterality Date    PELVISCOPY  7/15/2011    Performed by JERSON COYNE at [facility]    OVARIAN CYSTECTOMY  7/15/2011    [facility]     No Known Allergies  Outpatient Encounter Medications as of 6/28/2023   Medication Sig Dispense Refill    Omeprazole Magnesium 20.6 (20 Base) MG CAPSULE DELAYED RELEASE Take 1 tablet by mouth every day. 30 capsule 1    diclofenac DR (VOLTAREN) 75 MG Tablet Delayed Response Take 75 mg by mouth 2 times a day.      estrogens, conjugated (PREMARIN) 0.625 MG/GM Cream Insert 0.5 grams vaginally once daily in the evening. 30 g 1    estrogens, conjugated (PREMARIN) 0.625 MG/GM Cream Use 0.5 g vaginally daily at night 30 g 1     sulfamethoxazole-trimethoprim (BACTRIM DS) 800-160 MG tablet Take 1 tablet by mouth 2 times a day. 14 tablet 0    [DISCONTINUED] DULoxetine (CYMBALTA) 60 MG Cap DR Particles delayed-release capsule Take 60 mg by mouth every day. (Patient not taking: Reported on 6/28/2023)      oseltamivir (TAMIFLU) 75 MG Cap Take 1 Cap by mouth 2 times a day. 10 Cap 0    [DISCONTINUED] acetaminophen (TYLENOL) 500 MG Tab Take 500-1,000 mg by mouth every 6 hours as needed. (Patient not taking: Reported on 6/28/2023)      [DISCONTINUED] ibuprofen (MOTRIN) 200 MG Tab Take 200 mg by mouth every 6 hours as needed.      omeprazole (PRILOSEC) 40 MG delayed-release capsule Take 1 Cap by mouth every day. 90 Cap 2    amoxicillin-clavulanate (AUGMENTIN) 875-125 MG Tab Take 1 Tab by mouth 2 times a day. (Patient not taking: Reported on 6/28/2023) 14 Tab 0    medroxyPROGESTERone (PROVERA) 10 MG Tab Take 1 Tab by mouth every day. 10 Tab 6    progesterone (PROMETRIUM) 200 MG capsule Take 1 Cap by mouth every day. 30 Cap 11    MICONAZOLE NITRATE VAGINAL (MONISTAT 3) 4 % Cream 1 applicatorful per vagina HS x 3 days 1 Tube 2    progesterone (PROMETRIUM) 200 MG capsule Take 1 Cap by mouth every day. 10 Cap 11    meclizine (ANTIVERT) 25 MG Tab Take 1 Tab by mouth 4 times a day as needed for Dizziness. 20 Tab 0    naproxen (NAPROSYN) 500 MG Tab Take 1 Tab by mouth 2 times a day as needed. 30 Tab 0    omeprazole (PRILOSEC) 40 MG delayed-release capsule Take 1 Cap by mouth every day. 30 Cap 2    [DISCONTINUED] Omega-3 Fatty Acids (FISH OIL) 1000 MG Cap capsule Take 1,000 mg by mouth 3 times a day, with meals.       No facility-administered encounter medications on file as of 6/28/2023.     Social History     Socioeconomic History    Marital status:      Spouse name: Not on file    Number of children: Not on file    Years of education: Not on file    Highest education level: Not on file   Occupational History    Not on file   Tobacco Use    Smoking  status: Never    Smokeless tobacco: Never   Vaping Use    Vaping Use: Never used   Substance and Sexual Activity    Alcohol use: No    Drug use: No    Sexual activity: Never   Other Topics Concern    Not on file   Social History Narrative    Not on file     Social Determinants of Health     Financial Resource Strain: Not on file   Food Insecurity: Not on file   Transportation Needs: Not on file   Physical Activity: Not on file   Stress: Not on file   Social Connections: Not on file   Intimate Partner Violence: Not on file   Housing Stability: Not on file      Social History     Tobacco Use   Smoking Status Never   Smokeless Tobacco Never     Social History     Substance and Sexual Activity   Alcohol Use No     Social History     Substance and Sexual Activity   Drug Use No      Family History   Problem Relation Age of Onset    Diabetes Other     Hypertension Other          Review of Systems   Constitutional:  Negative for chills, fever, malaise/fatigue and weight loss.   HENT:  Negative for congestion, ear discharge, ear pain, hearing loss and nosebleeds.    Eyes:  Negative for blurred vision, double vision, photophobia, pain and discharge.   Respiratory:  Negative for cough, hemoptysis and sputum production.    Cardiovascular:  Negative for chest pain, palpitations, orthopnea and claudication.   Gastrointestinal:  Positive for abdominal pain, heartburn and nausea. Negative for constipation, diarrhea and vomiting.   Genitourinary:  Negative for dysuria, frequency, hematuria and urgency.   Musculoskeletal:  Negative for back pain, joint pain, myalgias and neck pain.   Skin:  Negative for itching and rash.   Neurological:  Negative for dizziness, tingling, tremors, sensory change, speech change, focal weakness and headaches.   Endo/Heme/Allergies:  Negative for environmental allergies. Does not bruise/bleed easily.   Psychiatric/Behavioral:  Negative for depression, hallucinations, substance abuse and suicidal ideas.  "The patient is not nervous/anxious.         Objective:   BP (!) 146/86   Pulse 72   Resp 18   Ht 1.651 m (5' 5\")   Wt 92 kg (202 lb 13.2 oz)   SpO2 95%   BMI 33.75 kg/m²     Physical Exam  Constitutional:       General: She is not in acute distress.  HENT:      Head: Normocephalic and atraumatic.   Eyes:      General: No scleral icterus.  Cardiovascular:      Rate and Rhythm: Normal rate and regular rhythm.   Pulmonary:      Effort: No respiratory distress.   Abdominal:      Palpations: Abdomen is soft.      Comments: Mildly tender to deep palpation in the right upper quadrant.  Otherwise nontender throughout.  No guarding or rebound.   Musculoskeletal:      Cervical back: Normal range of motion.   Neurological:      Mental Status: She is alert.         Labs  Lab Results   Component Value Date/Time    WBC 6.5 08/02/2023 02:37 PM    RBC 4.54 08/02/2023 02:37 PM    HEMOGLOBIN 14.0 08/02/2023 02:37 PM    HEMATOCRIT 40.7 08/02/2023 02:37 PM    MCV 89.6 08/02/2023 02:37 PM    MCH 30.8 08/02/2023 02:37 PM    MCHC 34.4 08/02/2023 02:37 PM    MPV 10.1 08/02/2023 02:37 PM    NEUTSPOLYS 52.60 11/14/2022 12:08 PM    LYMPHOCYTES 36.70 11/14/2022 12:08 PM    MONOCYTES 7.80 11/14/2022 12:08 PM    EOSINOPHILS 2.10 11/14/2022 12:08 PM    BASOPHILS 0.30 11/14/2022 12:08 PM      Component Ref Range & Units 3 mo ago 7 mo ago 9 mo ago 1 yr ago 2 yr ago 3 yr ago 4 yr ago   Sodium 135 - 145 mmol/L 140  136  136  137  139  137  140    Potassium 3.6 - 5.5 mmol/L 4.1  4.0  4.1  3.8  4.3  4.3  3.9    Chloride 96 - 112 mmol/L 107  102  103  101  104  103  107    Co2 20 - 33 mmol/L 23  25  21  21  24  26  24    Anion Gap 7.0 - 16.0 10.0  9.0  12.0  15.0  11.0  8.0 R  9.0 R    Glucose 65 - 99 mg/dL 100 High   168 High   138 High   110 High   105 High   85  103 High     Bun 8 - 22 mg/dL 20  17  17  17  20  20  15    Creatinine 0.50 - 1.40 mg/dL 0.63  0.53  0.49 Low   0.53  0.59  0.62  0.51    Calcium 8.5 - 10.5 mg/dL 9.1  9.1  8.5  9.0  " 8.9  9.3  9.0    AST(SGOT) 12 - 45 U/L 16  31  31   17  15  16    ALT(SGPT) 2 - 50 U/L 11  32  30   17  15  17    Alkaline Phosphatase 30 - 99 U/L 114 High   104 High   93   96  75  88    Total Bilirubin 0.1 - 1.5 mg/dL 0.3  0.4  0.2   0.6  0.6  0.6    Albumin 3.2 - 4.9 g/dL 4.4  4.5  4.3   4.5  4.4  4.2    Total Protein 6.0 - 8.2 g/dL 7.2  7.2  6.9   7.6  7.5  6.9    Globulin 1.9 - 3.5 g/dL 2.8  2.7  2.6   3.1  3.1  2.7    A-G Ratio g/dL 1.6  1.7  1.7   1.5        Imaging  HIDA SCAN 4/4/23      Pathology  None    Procedures  None    Diagnosis:     Biliary dyskinesia      Medical Decision Making:  Today's Assessment / Status / Plan:     53-year-old female who presents for evaluation of biliary dyskinesia.  She has ongoing right upper quadrant discomfort rating around to her back with intermittent nausea.  Her ejection fraction on her HIDA scan is 6%.  All of this fits with biliary dyskinesia and likely is related to the symptoms she is having.    I discussed with her the physiology of this disease as well as its management.  I recommended she undergo a robotic cholecystectomy.  The risk benefits and alternatives were explained in detail to the patient including but not limited to bleeding, infection, postoperative pain, wound healing issues, bile duct injury, biliary leak, VTE, MI, stroke and a small risk of death.  She understood all these reassessment and good questions all of which were answered and she is in agreement to proceed.    Proceed with robotic cholecystectomy, possible open      The patient asked several great questions which were answered to their satisfaction.  They  are in agreement with the care plan as outlined above.        Bernardino Story MD  Surgical Oncology

## 2023-08-23 ENCOUNTER — OFFICE VISIT (OUTPATIENT)
Dept: SURGICAL ONCOLOGY | Facility: MEDICAL CENTER | Age: 53
End: 2023-08-23
Payer: COMMERCIAL

## 2023-08-23 VITALS
DIASTOLIC BLOOD PRESSURE: 74 MMHG | WEIGHT: 198 LBS | SYSTOLIC BLOOD PRESSURE: 122 MMHG | OXYGEN SATURATION: 95 % | HEART RATE: 92 BPM | TEMPERATURE: 98.1 F | BODY MASS INDEX: 32.95 KG/M2

## 2023-08-23 DIAGNOSIS — Z90.49 S/P LAPAROSCOPIC CHOLECYSTECTOMY: ICD-10-CM

## 2023-08-23 DIAGNOSIS — K82.8 BILIARY DYSKINESIA: ICD-10-CM

## 2023-08-23 DIAGNOSIS — R10.11 RIGHT UPPER QUADRANT PAIN: ICD-10-CM

## 2023-08-23 DIAGNOSIS — F41.9 ANXIETY: ICD-10-CM

## 2023-08-23 PROCEDURE — 99024 POSTOP FOLLOW-UP VISIT: CPT | Performed by: SURGERY

## 2023-08-23 PROCEDURE — 3074F SYST BP LT 130 MM HG: CPT | Performed by: SURGERY

## 2023-08-23 PROCEDURE — 3078F DIAST BP <80 MM HG: CPT | Performed by: SURGERY

## 2023-08-23 RX ORDER — LORAZEPAM 0.5 MG/1
0.5 TABLET ORAL EVERY 4 HOURS PRN
Qty: 20 TABLET | Refills: 0 | Status: SHIPPED | OUTPATIENT
Start: 2023-08-23 | End: 2023-08-28

## 2023-08-23 RX ORDER — DOCUSATE SODIUM 100 MG/1
200 CAPSULE, LIQUID FILLED ORAL 2 TIMES DAILY
Qty: 60 CAPSULE | Refills: 0 | Status: SHIPPED | OUTPATIENT
Start: 2023-08-23

## 2023-08-23 RX ORDER — POLYETHYLENE GLYCOL 3350 17 G/17G
17 POWDER, FOR SOLUTION ORAL DAILY
Qty: 225 G | Refills: 0 | Status: SHIPPED | OUTPATIENT
Start: 2023-08-23

## 2023-08-23 ASSESSMENT — ENCOUNTER SYMPTOMS
COUGH: 0
DIZZINESS: 0
NERVOUS/ANXIOUS: 1
ABDOMINAL PAIN: 1
BACK PAIN: 0
NAUSEA: 0
VOMITING: 0
MYALGIAS: 0
CONSTIPATION: 0
BLURRED VISION: 0
DOUBLE VISION: 0
SPUTUM PRODUCTION: 0
ORTHOPNEA: 0
CLAUDICATION: 0
HEMOPTYSIS: 0
NECK PAIN: 0
PALPITATIONS: 0
CHILLS: 0
SPEECH CHANGE: 0
PHOTOPHOBIA: 0
HEADACHES: 0
SENSORY CHANGE: 0
EYE PAIN: 0
DEPRESSION: 0
FEVER: 0
HEARTBURN: 0
EYE DISCHARGE: 0
TREMORS: 0
FOCAL WEAKNESS: 0
HALLUCINATIONS: 0
BRUISES/BLEEDS EASILY: 0
WEIGHT LOSS: 0
TINGLING: 0
DIARRHEA: 0

## 2023-08-23 ASSESSMENT — FIBROSIS 4 INDEX: FIB4 SCORE: 1.07

## 2023-08-23 ASSESSMENT — LIFESTYLE VARIABLES: SUBSTANCE_ABUSE: 0

## 2023-08-24 NOTE — PROGRESS NOTES
Subjective:   9/6/2023  2:33 PM  Primary care physician: Nomi Santizo M.D.  Referring Provider: Ceasar Dunbar DO    Chief Complaint: No chief complaint on file.    Diagnosis:   1. Biliary dyskinesia        2. Elevated alkaline phosphatase level        3. Fatty liver        4. Right upper quadrant pain        5. S/P laparoscopic cholecystectomy          History of presenting illness:    Sangeetha Coates is a pleasant 53 y.o. female who presented to Dr. Dunbar for right upper quadrant pain. She was noted to have an alk phos of 114. She subsequently underwent a HIDA scan that demonstrated decreased gallbladder ejection fraction of 6% suggesting gallbladder dyskinesia.      She presents today for surgical evaluation.  She states she is having intermittent right upper quadrant pain.  She does have some associated nausea.  The pain does radiate around to the right side of her back.  She is able to eat but does have associated nausea sometimes.  She has no vomiting.  She is otherwise having regular bowel movements.  She denies any fevers or chills or new onset jaundice.     8/23/23:  She subsequently underwent a robotic cholecystectomy on 8/7/23. Pathology results demonstrated focal cholesterolosis, negative for malignancy.       She presents for a two week post operative examination.  Doing well in general, still some pain in the right side and back.  Tolerating diet, has some constipation.  She is feeling much better.  She is having regular bowel movements and her appetite is improved.  She still having some back pain    Update 9/6/23  She is here today for a second follow up from laparoscopic cholecystectomy done on 8/7/23.  She says she has always had some back pain but has been more persistent over the last couple weeks.  She is able to walk and function and otherwise do her daily activities but it has become more annoying and cumbersome.  She is taking Tylenol but does not get much relief from it.    Past  Medical History:   Diagnosis Date    Pneumonia     x2    Psychiatric problem     anxiety and depression     Past Surgical History:   Procedure Laterality Date    CHOLECYSTECTOMY ROBOTIC XI N/A 2023    Procedure: ROBOTIC CHOLECYSTECTOMY;  Surgeon: Bernardino Story M.D.;  Location: SURGERY [facility];  Service: Gen Robotic    PELVISCOPY  7/15/2011    Performed by JERSON COYNE at SURGERY SAME DAY [facility] ORS    OVARIAN CYSTECTOMY  7/15/2011    Performed by JERSON COYNE at SURGERY SAME DAY [facility] ORS     No Known Allergies  Outpatient Encounter Medications as of 2023   Medication Sig Dispense Refill    docusate sodium (COLACE) 100 MG Cap Take 2 Capsules by mouth 2 times a day. 60 Capsule 0    polyethylene glycol 3350 (MIRALAX) 17 GM/SCOOP Powder Take 17 g by mouth every day. 225 g 0    [] LORazepam (ATIVAN) 0.5 MG Tab Take 1 Tablet by mouth every four hours as needed for Anxiety for up to 5 days. 20 Tablet 0    losartan (COZAAR) 25 MG Tab Take 25 mg by mouth every day.      omeprazole (PRILOSEC) 40 MG delayed-release capsule Take 1 Cap by mouth every day. 30 Cap 2     No facility-administered encounter medications on file as of 2023.     Social History     Socioeconomic History    Marital status:      Spouse name: Not on file    Number of children: Not on file    Years of education: Not on file    Highest education level: Not on file   Occupational History    Not on file   Tobacco Use    Smoking status: Never    Smokeless tobacco: Never   Vaping Use    Vaping Use: Never used   Substance and Sexual Activity    Alcohol use: No    Drug use: No    Sexual activity: Never   Other Topics Concern    Not on file   Social History Narrative    Not on file     Social Determinants of Health     Financial Resource Strain: Not on file   Food Insecurity: Not on file   Transportation Needs: Not on file   Physical Activity: Not on file   Stress: Not on file   Social Connections: Not on file    Intimate Partner Violence: Not on file   Housing Stability: Not on file      Social History     Tobacco Use   Smoking Status Never   Smokeless Tobacco Never     Social History     Substance and Sexual Activity   Alcohol Use No     Social History     Substance and Sexual Activity   Drug Use No      Family History   Problem Relation Age of Onset    Diabetes Other     Hypertension Other      Review of Systems   Constitutional:  Negative for chills, fever, malaise/fatigue and weight loss.   HENT:  Negative for congestion, ear discharge, ear pain, hearing loss and nosebleeds.    Eyes:  Negative for blurred vision, double vision, photophobia, pain and discharge.   Respiratory:  Negative for cough, hemoptysis and sputum production.    Cardiovascular:  Negative for chest pain, palpitations, orthopnea and claudication.   Gastrointestinal:  Negative for abdominal pain, constipation, diarrhea, heartburn, nausea and vomiting.   Genitourinary:  Negative for dysuria, frequency, hematuria and urgency.   Musculoskeletal:  Positive for back pain. Negative for joint pain, myalgias and neck pain.   Skin:  Negative for itching and rash.   Neurological:  Negative for dizziness, tingling, tremors, sensory change, speech change, focal weakness and headaches.   Endo/Heme/Allergies:  Negative for environmental allergies. Does not bruise/bleed easily.   Psychiatric/Behavioral:  Negative for depression, hallucinations, substance abuse and suicidal ideas. The patient is not nervous/anxious.         Objective:   /72 (BP Location: Right arm, Patient Position: Sitting, BP Cuff Size: Adult)   Pulse 94   Temp 36.8 °C (98.2 °F) (Temporal)   Wt 91.6 kg (202 lb)   SpO2 99%   BMI 33.61 kg/m²     Physical Exam  Constitutional:       General: She is not in acute distress.  HENT:      Head: Normocephalic and atraumatic.   Eyes:      General: No scleral icterus.  Cardiovascular:      Rate and Rhythm: Normal rate and regular rhythm.    Pulmonary:      Effort: No respiratory distress.   Abdominal:      Palpations: Abdomen is soft.      Comments: Well-healed surgical incisions   Musculoskeletal:      Cervical back: Normal range of motion.   Neurological:      Mental Status: She is alert.         Labs   Latest Reference Range & Units 08/02/23 14:37   WBC 4.8 - 10.8 K/uL 6.5   RBC 4.20 - 5.40 M/uL 4.54   Hemoglobin 12.0 - 16.0 g/dL 14.0   Hematocrit 37.0 - 47.0 % 40.7   MCV 81.4 - 97.8 fL 89.6   MCH 27.0 - 33.0 pg 30.8   MCHC 32.2 - 35.5 g/dL 34.4   RDW 35.9 - 50.0 fL 42.6   Platelet Count 164 - 446 K/uL 229   MPV 9.0 - 12.9 fL 10.1      Latest Reference Range & Units 03/03/23 10:33 08/02/23 14:37   Sodium 135 - 145 mmol/L 140 139   Potassium 3.6 - 5.5 mmol/L 4.1 4.1   Chloride 96 - 112 mmol/L 107 106   Co2 20 - 33 mmol/L 23 21   Anion Gap 7.0 - 16.0  10.0 12.0   Glucose 65 - 99 mg/dL 100 (H) 106 (H)   Bun 8 - 22 mg/dL 20 18   Creatinine 0.50 - 1.40 mg/dL 0.63 0.59   GFR (CKD-EPI) >60 mL/min/1.73 m 2 107 109   Calcium 8.5 - 10.5 mg/dL 9.1 9.0   Correct Calcium 8.5 - 10.5 mg/dL 8.8    AST(SGOT) 12 - 45 U/L 16    ALT(SGPT) 2 - 50 U/L 11    Alkaline Phosphatase 30 - 99 U/L 114 (H)    Total Bilirubin 0.1 - 1.5 mg/dL 0.3    Albumin 3.2 - 4.9 g/dL 4.4    Total Protein 6.0 - 8.2 g/dL 7.2    Globulin 1.9 - 3.5 g/dL 2.8    A-G Ratio g/dL 1.6    (H): Data is abnormally high    Imaging  HIDA SCAN 4/4/23       Pathology  8/7/23 SURGICAL PATHOLOGY CONSULTATION   FINAL DIAGNOSIS:   A. Gallbladder:          Focal cholesterolosis, negative for malignancy       Procedures  8/7/23 Robotic cholecystectomy    Diagnosis:     1. Biliary dyskinesia        2. Elevated alkaline phosphatase level        3. Fatty liver        4. Right upper quadrant pain        5. S/P laparoscopic cholecystectomy            Medical Decision Making:  Today's Assessment / Status / Plan:     53-year-old female with biliary dyskinesia status post robotic cholecystectomy. Here for follow up.   Doing much better than she was 2 weeks ago.  Tolerating a diet.  She still having back pain but no abdominal pain and having regular bowel movements.  She does have a history of back pain but this seems to be a little bit more persistent.  I did explain to her that it is possible she aggravated her back in the midst of recovery from her surgery.  I did recommend she trial NSAIDs to see if that helps but also that she should follow-up with her primary care doctor for possible referral for physical therapy or further evaluation.  Otherwise she can follow-up with me on a as needed basis.  She has no further restrictions.  She has her contact number if something were to arise and she had questions.    I, Bernardino Story MD have entered, reviewed and confirmed the above diagnosis related to this patient on this date of service, September 6, 2023 2:58 PM    Bernardino Story M.D.

## 2023-09-06 ENCOUNTER — OFFICE VISIT (OUTPATIENT)
Dept: SURGICAL ONCOLOGY | Facility: MEDICAL CENTER | Age: 53
End: 2023-09-06
Payer: COMMERCIAL

## 2023-09-06 VITALS
WEIGHT: 202 LBS | OXYGEN SATURATION: 99 % | DIASTOLIC BLOOD PRESSURE: 72 MMHG | HEART RATE: 94 BPM | TEMPERATURE: 98.2 F | SYSTOLIC BLOOD PRESSURE: 118 MMHG | BODY MASS INDEX: 33.61 KG/M2

## 2023-09-06 DIAGNOSIS — K76.0 FATTY LIVER: ICD-10-CM

## 2023-09-06 DIAGNOSIS — Z90.49 S/P LAPAROSCOPIC CHOLECYSTECTOMY: ICD-10-CM

## 2023-09-06 DIAGNOSIS — K82.8 BILIARY DYSKINESIA: ICD-10-CM

## 2023-09-06 DIAGNOSIS — R74.8 ELEVATED ALKALINE PHOSPHATASE LEVEL: ICD-10-CM

## 2023-09-06 DIAGNOSIS — R10.11 RIGHT UPPER QUADRANT PAIN: ICD-10-CM

## 2023-09-06 PROCEDURE — 3074F SYST BP LT 130 MM HG: CPT | Performed by: SURGERY

## 2023-09-06 PROCEDURE — 3078F DIAST BP <80 MM HG: CPT | Performed by: SURGERY

## 2023-09-06 PROCEDURE — 99024 POSTOP FOLLOW-UP VISIT: CPT | Performed by: SURGERY

## 2023-09-06 ASSESSMENT — ENCOUNTER SYMPTOMS
MYALGIAS: 0
HEADACHES: 0
BACK PAIN: 1
CLAUDICATION: 0
EYE PAIN: 0
TINGLING: 0
NECK PAIN: 0
DEPRESSION: 0
TREMORS: 0
BLURRED VISION: 0
EYE DISCHARGE: 0
HEARTBURN: 0
NAUSEA: 0
COUGH: 0
HALLUCINATIONS: 0
SPEECH CHANGE: 0
DIARRHEA: 0
SPUTUM PRODUCTION: 0
SENSORY CHANGE: 0
CONSTIPATION: 0
VOMITING: 0
HEMOPTYSIS: 0
FOCAL WEAKNESS: 0
DOUBLE VISION: 0
ABDOMINAL PAIN: 0
WEIGHT LOSS: 0
CHILLS: 0
FEVER: 0
BRUISES/BLEEDS EASILY: 0
PALPITATIONS: 0
PHOTOPHOBIA: 0
ORTHOPNEA: 0
NERVOUS/ANXIOUS: 0
DIZZINESS: 0

## 2023-09-06 ASSESSMENT — LIFESTYLE VARIABLES: SUBSTANCE_ABUSE: 0

## 2023-09-06 ASSESSMENT — FIBROSIS 4 INDEX: FIB4 SCORE: 1.07

## 2023-09-27 NOTE — PROGRESS NOTES
Subjective:      Primary care physician: Nomi Santizo M.D.  Referring Provider: Ceasar Dunbar DO    Chief Complaint: No chief complaint on file.    Diagnosis:   1. Biliary dyskinesia        2. Elevated alkaline phosphatase level        3. Fatty liver        4. Right upper quadrant pain        5. S/P laparoscopic cholecystectomy          History of presenting illness:    Sangeetha Coates is a pleasant 53 y.o. female who presented to Dr. Dunbar for right upper quadrant pain. She was noted to have an alk phos of 114. She subsequently underwent a HIDA scan that demonstrated decreased gallbladder ejection fraction of 6% suggesting gallbladder dyskinesia.      She presents today for surgical evaluation.  She states she is having intermittent right upper quadrant pain.  She does have some associated nausea.  The pain does radiate around to the right side of her back.  She is able to eat but does have associated nausea sometimes.  She has no vomiting.  She is otherwise having regular bowel movements.  She denies any fevers or chills or new onset jaundice.     8/23/23:  She subsequently underwent a robotic cholecystectomy on 8/7/23. Pathology results demonstrated focal cholesterolosis, negative for malignancy.       She presents for a two week post operative examination.  Doing well in general, still some pain in the right side and back.  Tolerating diet, has some constipation.  She is feeling much better.  She is having regular bowel movements and her appetite is improved.  She still having some back pain     Update 9/6/23  She is here today for a second follow up from laparoscopic cholecystectomy done on 8/7/23.  She says she has always had some back pain but has been more persistent over the last couple weeks.  She is able to walk and function and otherwise do her daily activities but it has become more annoying and cumbersome.  She is taking Tylenol but does not get much relief from it.    Update  10/11/23    She is here today for ***    Past Medical History:   Diagnosis Date    Pneumonia     x2    Psychiatric problem     anxiety and depression     Past Surgical History:   Procedure Laterality Date    CHOLECYSTECTOMY ROBOTIC XI N/A 8/7/2023    Procedure: ROBOTIC CHOLECYSTECTOMY;  Surgeon: Bernardino Story M.D.;  Location: SURGERY [facility];  Service: Gen Robotic    [facility]LVISCOPY  7/15/2011    [facility]    OVARIAN CYSTECTOMY  7/15/2011    [facility]     No Known Allergies  Outpatient Encounter Medications as of 10/11/2023   Medication Sig Dispense Refill    docusate sodium (COLACE) 100 MG Cap Take 2 Capsules by mouth 2 times a day. 60 Capsule 0    polyethylene glycol 3350 (MIRALAX) 17 GM/SCOOP Powder Take 17 g by mouth every day. 225 g 0    losartan (COZAAR) 25 MG Tab Take 25 mg by mouth every day.      omeprazole (PRILOSEC) 40 MG delayed-release capsule Take 1 Cap by mouth every day. 30 Cap 2     No facility-administered encounter medications on file as of 10/11/2023.     Social History     Socioeconomic History    Marital status:      Spouse name: Not on file    Number of children: Not on file    Years of education: Not on file    Highest education level: Not on file   Occupational History    Not on file   Tobacco Use    Smoking status: Never    Smokeless tobacco: Never   Vaping Use    Vaping Use: Never used   Substance and Sexual Activity    Alcohol use: No    Drug use: No    Sexual activity: Never   Other Topics Concern    Not on file   Social History Narrative    Not on file     Social Determinants of Health     Financial Resource Strain: Not on file   Food Insecurity: Not on file   Transportation Needs: Not on file   Physical Activity: Not on file   Stress: Not on file   Social Connections: Not on file   Intimate Partner Violence: Not on file   Housing Stability: Not on file      Social  History     Tobacco Use   Smoking Status Never   Smokeless Tobacco Never     Social History     Substance and Sexual Activity   Alcohol Use No     Social History     Substance and Sexual Activity   Drug Use No      Family History   Problem Relation Age of Onset    Diabetes Other     Hypertension Other      ROS     Objective:   There were no vitals taken for this visit.    Physical Exam    Labs    Latest Reference Range & Units 08/02/23 14:37   WBC 4.8 - 10.8 K/uL 6.5   RBC 4.20 - 5.40 M/uL 4.54   Hemoglobin 12.0 - 16.0 g/dL 14.0   Hematocrit 37.0 - 47.0 % 40.7   MCV 81.4 - 97.8 fL 89.6   MCH 27.0 - 33.0 pg 30.8   MCHC 32.2 - 35.5 g/dL 34.4   RDW 35.9 - 50.0 fL 42.6   Platelet Count 164 - 446 K/uL 229   MPV 9.0 - 12.9 fL 10.1        Latest Reference Range & Units 03/03/23 10:33 08/02/23 14:37   Sodium 135 - 145 mmol/L 140 139   Potassium 3.6 - 5.5 mmol/L 4.1 4.1   Chloride 96 - 112 mmol/L 107 106   Co2 20 - 33 mmol/L 23 21   Anion Gap 7.0 - 16.0  10.0 12.0   Glucose 65 - 99 mg/dL 100 (H) 106 (H)   Bun 8 - 22 mg/dL 20 18   Creatinine 0.50 - 1.40 mg/dL 0.63 0.59   GFR (CKD-EPI) >60 mL/min/1.73 m 2 107 109   Calcium 8.5 - 10.5 mg/dL 9.1 9.0   Correct Calcium 8.5 - 10.5 mg/dL 8.8     AST(SGOT) 12 - 45 U/L 16     ALT(SGPT) 2 - 50 U/L 11     Alkaline Phosphatase 30 - 99 U/L 114 (H)     Total Bilirubin 0.1 - 1.5 mg/dL 0.3     Albumin 3.2 - 4.9 g/dL 4.4     Total Protein 6.0 - 8.2 g/dL 7.2     Globulin 1.9 - 3.5 g/dL 2.8     A-G Ratio g/dL 1.6     (H): Data is abnormally high     Imaging  HIDA SCAN 4/4/23       Pathology  8/7/23 SURGICAL PATHOLOGY CONSULTATION   FINAL DIAGNOSIS:   A. Gallbladder:          Focal cholesterolosis, negative for malignancy       Procedures  8/7/23 Robotic cholecystectomy    Diagnosis:     1. Biliary dyskinesia        2. Elevated alkaline phosphatase level        3. Fatty liver        4. Right upper quadrant pain        5. S/P laparoscopic cholecystectomy          Medical Decision Making:  Today's  Assessment / Status / Plan:     ***    I, Dr Story, have entered, reviewed and confirmed the above diagnoses related to this patient on this date of service, as per the time and date noted at top of this note.

## 2023-10-11 ENCOUNTER — APPOINTMENT (OUTPATIENT)
Dept: SURGICAL ONCOLOGY | Facility: MEDICAL CENTER | Age: 53
End: 2023-10-11
Payer: COMMERCIAL

## 2023-11-06 NOTE — PROGRESS NOTES
Subjective:      Primary care physician: Nomi Santizo M.D.  Referring Provider: Ceasar Dunbar DO    Chief Complaint: No chief complaint on file.    Diagnosis:   1. Biliary dyskinesia        2. Elevated alkaline phosphatase level        3. Fatty liver        4. Right upper quadrant pain        5. S/P laparoscopic cholecystectomy          History of presenting illness:    Sangeetha Coates is a pleasant 53 y.o. female who presented to Dr. Dunbar for right upper quadrant pain. She was noted to have an alk phos of 114. She subsequently underwent a HIDA scan that demonstrated decreased gallbladder ejection fraction of 6% suggesting gallbladder dyskinesia.      She presents today for surgical evaluation.  She states she is having intermittent right upper quadrant pain.  She does have some associated nausea.  The pain does radiate around to the right side of her back.  She is able to eat but does have associated nausea sometimes.  She has no vomiting.  She is otherwise having regular bowel movements.  She denies any fevers or chills or new onset jaundice.     8/23/23:  She subsequently underwent a robotic cholecystectomy on 8/7/23. Pathology results demonstrated focal cholesterolosis, negative for malignancy.       She presents for a two week post operative examination.  Doing well in general, still some pain in the right side and back.  Tolerating diet, has some constipation.  She is feeling much better.  She is having regular bowel movements and her appetite is improved.  She still having some back pain     Update 9/6/23  She is here today for a second follow up from laparoscopic cholecystectomy done on 8/7/23.  She says she has always had some back pain but has been more persistent over the last couple weeks.  She is able to walk and function and otherwise do her daily activities but it has become more annoying and cumbersome.  She is taking Tylenol but does not get much relief from it.    Update  11/29/23    She is here today for ***    Past Medical History:   Diagnosis Date    Pneumonia     x2    Psychiatric problem     anxiety and depression     Past Surgical History:   Procedure Laterality Date    CHOLECYSTECTOMY ROBOTIC XI N/A 8/7/2023    Procedure: ROBOTIC CHOLECYSTECTOMY;  Surgeon: Bernardino Story M.D.;  Location: SURGERY [facility];  Service: Gen Robotic    PELVISCOPY  7/15/2011    [facility]    OVARIAN CYSTECTOMY  7/15/2011    [facility]     No Known Allergies  Outpatient Encounter Medications as of 11/14/2023   Medication Sig Dispense Refill    docusate sodium (COLACE) 100 MG Cap Take 2 Capsules by mouth 2 times a day. 60 Capsule 0    polyethylene glycol 3350 (MIRALAX) 17 GM/SCOOP Powder Take 17 g by mouth every day. 225 g 0    losartan (COZAAR) 25 MG Tab Take 25 mg by mouth every day.      omeprazole (PRILOSEC) 40 MG delayed-release capsule Take 1 Cap by mouth every day. 30 Cap 2     No facility-administered encounter medications on file as of 11/14/2023.     Social History     Socioeconomic History    Marital status:      Spouse name: Not on file    Number of children: Not on file    Years of education: Not on file    Highest education level: Not on file   Occupational History    Not on file   Tobacco Use    Smoking status: Never    Smokeless tobacco: Never   Vaping Use    Vaping Use: Never used   Substance and Sexual Activity    Alcohol use: No    Drug use: No    Sexual activity: Never   Other Topics Concern    Not on file   Social History Narrative    Not on file     Social Determinants of Health     Financial Resource Strain: Not on file   Food Insecurity: Not on file   Transportation Needs: Not on file   Physical Activity: Not on file   Stress: Not on file   Social Connections: Not on file   Intimate Partner Violence: Not on file   Housing Stability: Not on file      Social  History     Tobacco Use   Smoking Status Never   Smokeless Tobacco Never     Social History     Substance and Sexual Activity   Alcohol Use No     Social History     Substance and Sexual Activity   Drug Use No      Family History   Problem Relation Age of Onset    Diabetes Other     Hypertension Other      ROS     Objective:   There were no vitals taken for this visit.    Physical Exam    Labs    Latest Reference Range & Units 08/02/23 14:37   WBC 4.8 - 10.8 K/uL 6.5   RBC 4.20 - 5.40 M/uL 4.54   Hemoglobin 12.0 - 16.0 g/dL 14.0   Hematocrit 37.0 - 47.0 % 40.7   MCV 81.4 - 97.8 fL 89.6   MCH 27.0 - 33.0 pg 30.8   MCHC 32.2 - 35.5 g/dL 34.4   RDW 35.9 - 50.0 fL 42.6   Platelet Count 164 - 446 K/uL 229   MPV 9.0 - 12.9 fL 10.1        Latest Reference Range & Units 03/03/23 10:33 08/02/23 14:37   Sodium 135 - 145 mmol/L 140 139   Potassium 3.6 - 5.5 mmol/L 4.1 4.1   Chloride 96 - 112 mmol/L 107 106   Co2 20 - 33 mmol/L 23 21   Anion Gap 7.0 - 16.0  10.0 12.0   Glucose 65 - 99 mg/dL 100 (H) 106 (H)   Bun 8 - 22 mg/dL 20 18   Creatinine 0.50 - 1.40 mg/dL 0.63 0.59   GFR (CKD-EPI) >60 mL/min/1.73 m 2 107 109   Calcium 8.5 - 10.5 mg/dL 9.1 9.0   Correct Calcium 8.5 - 10.5 mg/dL 8.8     AST(SGOT) 12 - 45 U/L 16     ALT(SGPT) 2 - 50 U/L 11     Alkaline Phosphatase 30 - 99 U/L 114 (H)     Total Bilirubin 0.1 - 1.5 mg/dL 0.3     Albumin 3.2 - 4.9 g/dL 4.4     Total Protein 6.0 - 8.2 g/dL 7.2     Globulin 1.9 - 3.5 g/dL 2.8     A-G Ratio g/dL 1.6     (H): Data is abnormally high     Imaging  HIDA SCAN 4/4/23       Pathology  8/7/23 SURGICAL PATHOLOGY CONSULTATION   FINAL DIAGNOSIS:   A. Gallbladder:          Focal cholesterolosis, negative for malignancy       Procedures  8/7/23 Robotic cholecystectomy    Diagnosis:     1. Biliary dyskinesia        2. Elevated alkaline phosphatase level        3. Fatty liver        4. Right upper quadrant pain        5. S/P laparoscopic cholecystectomy            Medical Decision Making:   Today's Assessment / Status / Plan:     ***    I, Dr Story, have entered, reviewed and confirmed the above diagnoses related to this patient on this date of service, as per the time and date noted at top of this note.

## 2023-11-29 ENCOUNTER — APPOINTMENT (OUTPATIENT)
Dept: SURGICAL ONCOLOGY | Facility: MEDICAL CENTER | Age: 53
End: 2023-11-29
Payer: COMMERCIAL

## 2024-07-16 ENCOUNTER — HOSPITAL ENCOUNTER (OUTPATIENT)
Dept: RADIOLOGY | Facility: MEDICAL CENTER | Age: 54
End: 2024-07-16
Payer: COMMERCIAL

## 2024-07-16 DIAGNOSIS — R10.11 ABDOMINAL PAIN, RIGHT UPPER QUADRANT: ICD-10-CM

## 2024-07-16 PROCEDURE — 76700 US EXAM ABDOM COMPLETE: CPT

## 2024-07-20 ENCOUNTER — HOSPITAL ENCOUNTER (OUTPATIENT)
Dept: LAB | Facility: MEDICAL CENTER | Age: 54
End: 2024-07-20
Payer: COMMERCIAL

## 2024-07-20 LAB
ALBUMIN SERPL BCP-MCNC: 4.3 G/DL (ref 3.2–4.9)
ALBUMIN/GLOB SERPL: 1.8 G/DL
ALP SERPL-CCNC: 115 U/L (ref 30–99)
ALT SERPL-CCNC: 16 U/L (ref 2–50)
ANION GAP SERPL CALC-SCNC: 12 MMOL/L (ref 7–16)
AST SERPL-CCNC: 16 U/L (ref 12–45)
BASOPHILS # BLD AUTO: 0.2 % (ref 0–1.8)
BASOPHILS # BLD: 0.02 K/UL (ref 0–0.12)
BILIRUB SERPL-MCNC: 0.4 MG/DL (ref 0.1–1.5)
BUN SERPL-MCNC: 17 MG/DL (ref 8–22)
CALCIUM ALBUM COR SERPL-MCNC: 9.2 MG/DL (ref 8.5–10.5)
CALCIUM SERPL-MCNC: 9.4 MG/DL (ref 8.5–10.5)
CHLORIDE SERPL-SCNC: 102 MMOL/L (ref 96–112)
CO2 SERPL-SCNC: 24 MMOL/L (ref 20–33)
CREAT SERPL-MCNC: 0.51 MG/DL (ref 0.5–1.4)
EOSINOPHIL # BLD AUTO: 0.05 K/UL (ref 0–0.51)
EOSINOPHIL NFR BLD: 0.5 % (ref 0–6.9)
ERYTHROCYTE [DISTWIDTH] IN BLOOD BY AUTOMATED COUNT: 43.4 FL (ref 35.9–50)
GFR SERPLBLD CREATININE-BSD FMLA CKD-EPI: 112 ML/MIN/1.73 M 2
GLOBULIN SER CALC-MCNC: 2.4 G/DL (ref 1.9–3.5)
GLUCOSE SERPL-MCNC: 89 MG/DL (ref 65–99)
HCT VFR BLD AUTO: 43.5 % (ref 37–47)
HGB BLD-MCNC: 14.5 G/DL (ref 12–16)
IMM GRANULOCYTES # BLD AUTO: 0.04 K/UL (ref 0–0.11)
IMM GRANULOCYTES NFR BLD AUTO: 0.4 % (ref 0–0.9)
LIPASE SERPL-CCNC: 18 U/L (ref 11–82)
LYMPHOCYTES # BLD AUTO: 3.95 K/UL (ref 1–4.8)
LYMPHOCYTES NFR BLD: 40.8 % (ref 22–41)
MCH RBC QN AUTO: 30.5 PG (ref 27–33)
MCHC RBC AUTO-ENTMCNC: 33.3 G/DL (ref 32.2–35.5)
MCV RBC AUTO: 91.6 FL (ref 81.4–97.8)
MONOCYTES # BLD AUTO: 0.76 K/UL (ref 0–0.85)
MONOCYTES NFR BLD AUTO: 7.9 % (ref 0–13.4)
NEUTROPHILS # BLD AUTO: 4.85 K/UL (ref 1.82–7.42)
NEUTROPHILS NFR BLD: 50.2 % (ref 44–72)
NRBC # BLD AUTO: 0 K/UL
NRBC BLD-RTO: 0 /100 WBC (ref 0–0.2)
PLATELET # BLD AUTO: 274 K/UL (ref 164–446)
PMV BLD AUTO: 10.1 FL (ref 9–12.9)
POTASSIUM SERPL-SCNC: 4.2 MMOL/L (ref 3.6–5.5)
PROT SERPL-MCNC: 6.7 G/DL (ref 6–8.2)
RBC # BLD AUTO: 4.75 M/UL (ref 4.2–5.4)
SODIUM SERPL-SCNC: 138 MMOL/L (ref 135–145)
WBC # BLD AUTO: 9.7 K/UL (ref 4.8–10.8)

## 2024-07-20 PROCEDURE — 36415 COLL VENOUS BLD VENIPUNCTURE: CPT

## 2024-07-20 PROCEDURE — 83690 ASSAY OF LIPASE: CPT

## 2024-07-20 PROCEDURE — 85025 COMPLETE CBC W/AUTO DIFF WBC: CPT

## 2024-07-20 PROCEDURE — 80053 COMPREHEN METABOLIC PANEL: CPT

## 2024-08-01 ENCOUNTER — HOSPITAL ENCOUNTER (OUTPATIENT)
Dept: LAB | Facility: MEDICAL CENTER | Age: 54
End: 2024-08-01
Attending: FAMILY MEDICINE
Payer: COMMERCIAL

## 2024-08-01 LAB
T4 FREE SERPL-MCNC: 1.21 NG/DL (ref 0.93–1.7)
TSH SERPL-ACNC: 1.78 UIU/ML (ref 0.35–5.5)

## 2024-08-01 PROCEDURE — 84443 ASSAY THYROID STIM HORMONE: CPT

## 2024-08-01 PROCEDURE — 87086 URINE CULTURE/COLONY COUNT: CPT

## 2024-08-01 PROCEDURE — 36415 COLL VENOUS BLD VENIPUNCTURE: CPT

## 2024-08-01 PROCEDURE — 84439 ASSAY OF FREE THYROXINE: CPT

## 2024-08-03 LAB
BACTERIA UR CULT: NORMAL
SIGNIFICANT IND 70042: NORMAL
SITE SITE: NORMAL
SOURCE SOURCE: NORMAL

## 2024-10-11 ENCOUNTER — HOSPITAL ENCOUNTER (OUTPATIENT)
Dept: LAB | Facility: MEDICAL CENTER | Age: 54
End: 2024-10-11
Attending: FAMILY MEDICINE
Payer: COMMERCIAL

## 2024-10-11 LAB
ALBUMIN SERPL BCP-MCNC: 4.3 G/DL (ref 3.2–4.9)
ALBUMIN/GLOB SERPL: 1.7 G/DL
ALP SERPL-CCNC: 117 U/L (ref 30–99)
ALT SERPL-CCNC: 14 U/L (ref 2–50)
ANION GAP SERPL CALC-SCNC: 14 MMOL/L (ref 7–16)
AST SERPL-CCNC: 17 U/L (ref 12–45)
BASOPHILS # BLD AUTO: 0.4 % (ref 0–1.8)
BASOPHILS # BLD: 0.02 K/UL (ref 0–0.12)
BILIRUB SERPL-MCNC: 0.4 MG/DL (ref 0.1–1.5)
BUN SERPL-MCNC: 19 MG/DL (ref 8–22)
CALCIUM ALBUM COR SERPL-MCNC: 8.5 MG/DL (ref 8.5–10.5)
CALCIUM SERPL-MCNC: 8.7 MG/DL (ref 8.5–10.5)
CHLORIDE SERPL-SCNC: 101 MMOL/L (ref 96–112)
CHOLEST SERPL-MCNC: 132 MG/DL (ref 100–199)
CO2 SERPL-SCNC: 22 MMOL/L (ref 20–33)
CREAT SERPL-MCNC: 0.51 MG/DL (ref 0.5–1.4)
CREAT UR-MCNC: 136 MG/DL
EOSINOPHIL # BLD AUTO: 0.11 K/UL (ref 0–0.51)
EOSINOPHIL NFR BLD: 2.1 % (ref 0–6.9)
ERYTHROCYTE [DISTWIDTH] IN BLOOD BY AUTOMATED COUNT: 43.4 FL (ref 35.9–50)
EST. AVERAGE GLUCOSE BLD GHB EST-MCNC: 120 MG/DL
FASTING STATUS PATIENT QL REPORTED: NORMAL
GFR SERPLBLD CREATININE-BSD FMLA CKD-EPI: 112 ML/MIN/1.73 M 2
GLOBULIN SER CALC-MCNC: 2.5 G/DL (ref 1.9–3.5)
GLUCOSE SERPL-MCNC: 105 MG/DL (ref 65–99)
HBA1C MFR BLD: 5.8 % (ref 4–5.6)
HCT VFR BLD AUTO: 37.2 % (ref 37–47)
HDLC SERPL-MCNC: 41 MG/DL
HGB BLD-MCNC: 12 G/DL (ref 12–16)
IMM GRANULOCYTES # BLD AUTO: 0.02 K/UL (ref 0–0.11)
IMM GRANULOCYTES NFR BLD AUTO: 0.4 % (ref 0–0.9)
LDLC SERPL CALC-MCNC: 74 MG/DL
LYMPHOCYTES # BLD AUTO: 2.14 K/UL (ref 1–4.8)
LYMPHOCYTES NFR BLD: 40.3 % (ref 22–41)
MCH RBC QN AUTO: 28.8 PG (ref 27–33)
MCHC RBC AUTO-ENTMCNC: 32.3 G/DL (ref 32.2–35.5)
MCV RBC AUTO: 89.2 FL (ref 81.4–97.8)
MICROALBUMIN UR-MCNC: <1.2 MG/DL
MICROALBUMIN/CREAT UR: NORMAL MG/G (ref 0–30)
MONOCYTES # BLD AUTO: 0.51 K/UL (ref 0–0.85)
MONOCYTES NFR BLD AUTO: 9.6 % (ref 0–13.4)
NEUTROPHILS # BLD AUTO: 2.51 K/UL (ref 1.82–7.42)
NEUTROPHILS NFR BLD: 47.2 % (ref 44–72)
NRBC # BLD AUTO: 0 K/UL
NRBC BLD-RTO: 0 /100 WBC (ref 0–0.2)
PLATELET # BLD AUTO: 242 K/UL (ref 164–446)
PMV BLD AUTO: 10.3 FL (ref 9–12.9)
POTASSIUM SERPL-SCNC: 4 MMOL/L (ref 3.6–5.5)
PROT SERPL-MCNC: 6.8 G/DL (ref 6–8.2)
RBC # BLD AUTO: 4.17 M/UL (ref 4.2–5.4)
SODIUM SERPL-SCNC: 137 MMOL/L (ref 135–145)
TRIGL SERPL-MCNC: 83 MG/DL (ref 0–149)
WBC # BLD AUTO: 5.3 K/UL (ref 4.8–10.8)

## 2024-10-11 PROCEDURE — 82043 UR ALBUMIN QUANTITATIVE: CPT

## 2024-10-11 PROCEDURE — 83036 HEMOGLOBIN GLYCOSYLATED A1C: CPT

## 2024-10-11 PROCEDURE — 80061 LIPID PANEL: CPT

## 2024-10-11 PROCEDURE — 82570 ASSAY OF URINE CREATININE: CPT

## 2024-10-11 PROCEDURE — 80053 COMPREHEN METABOLIC PANEL: CPT

## 2024-10-11 PROCEDURE — 85025 COMPLETE CBC W/AUTO DIFF WBC: CPT

## 2024-10-11 PROCEDURE — 36415 COLL VENOUS BLD VENIPUNCTURE: CPT

## 2025-03-28 ENCOUNTER — HOSPITAL ENCOUNTER (OUTPATIENT)
Dept: LAB | Facility: MEDICAL CENTER | Age: 55
End: 2025-03-28
Attending: FAMILY MEDICINE
Payer: COMMERCIAL

## 2025-03-28 LAB
ALBUMIN SERPL BCP-MCNC: 3.9 G/DL (ref 3.2–4.9)
ALBUMIN/GLOB SERPL: 1.4 G/DL
ALP SERPL-CCNC: 110 U/L (ref 30–99)
ALT SERPL-CCNC: 13 U/L (ref 2–50)
ANION GAP SERPL CALC-SCNC: 10 MMOL/L (ref 7–16)
AST SERPL-CCNC: 20 U/L (ref 12–45)
BILIRUB SERPL-MCNC: 0.3 MG/DL (ref 0.1–1.5)
BUN SERPL-MCNC: 14 MG/DL (ref 8–22)
CALCIUM ALBUM COR SERPL-MCNC: 8.6 MG/DL (ref 8.5–10.5)
CALCIUM SERPL-MCNC: 8.5 MG/DL (ref 8.5–10.5)
CHLORIDE SERPL-SCNC: 108 MMOL/L (ref 96–112)
CO2 SERPL-SCNC: 20 MMOL/L (ref 20–33)
CREAT SERPL-MCNC: 0.56 MG/DL (ref 0.5–1.4)
EST. AVERAGE GLUCOSE BLD GHB EST-MCNC: 143 MG/DL
GFR SERPLBLD CREATININE-BSD FMLA CKD-EPI: 109 ML/MIN/1.73 M 2
GLOBULIN SER CALC-MCNC: 2.7 G/DL (ref 1.9–3.5)
GLUCOSE SERPL-MCNC: 114 MG/DL (ref 65–99)
HBA1C MFR BLD: 6.6 % (ref 4–5.6)
POTASSIUM SERPL-SCNC: 4.1 MMOL/L (ref 3.6–5.5)
PROT SERPL-MCNC: 6.6 G/DL (ref 6–8.2)
SODIUM SERPL-SCNC: 138 MMOL/L (ref 135–145)
TSH SERPL-ACNC: 1.53 UIU/ML (ref 0.35–5.5)

## 2025-03-28 PROCEDURE — 36415 COLL VENOUS BLD VENIPUNCTURE: CPT

## 2025-03-28 PROCEDURE — 80053 COMPREHEN METABOLIC PANEL: CPT

## 2025-03-28 PROCEDURE — 84443 ASSAY THYROID STIM HORMONE: CPT

## 2025-03-28 PROCEDURE — 83036 HEMOGLOBIN GLYCOSYLATED A1C: CPT

## 2025-07-03 ENCOUNTER — APPOINTMENT (OUTPATIENT)
Dept: CARDIOLOGY | Facility: MEDICAL CENTER | Age: 55
End: 2025-07-03
Attending: FAMILY MEDICINE
Payer: COMMERCIAL

## (undated) DEVICE — NEEDLE INSFL 120MM 14GA VRRS - (20/BX)

## (undated) DEVICE — SUCTION INSTRUMENT YANKAUER BULBOUS TIP W/O VENT (50EA/CA)

## (undated) DEVICE — COVER LIGHT HANDLE ALC PLUS DISP (18EA/BX)

## (undated) DEVICE — HOOK PERMANENT CAUTERY DA VINCI 10X'S REUSABLE

## (undated) DEVICE — SEAL 5MM-8MM UNIVERSAL  BOX OF 10

## (undated) DEVICE — SHEARS MONOPOLAR CURVED  DA VINCI 10X'S REUSABLE

## (undated) DEVICE — SUTURE 0 VICRYL PLUS UR-6 - 27 INCH (36/BX)

## (undated) DEVICE — TUBING CLEARLINK DUO-VENT - C-FLO (48EA/CA)

## (undated) DEVICE — SUTURE 4-0 MONOCRYL PLUS PS-2 - 27 INCH (36/BX)

## (undated) DEVICE — FORCEPS FENESTRATED BIPOLAR (14UN/EA)

## (undated) DEVICE — SLEEVE, VASO, THIGH, MED

## (undated) DEVICE — FORCEPS PROGRASP (18UN/EA)

## (undated) DEVICE — ROBOTIC SURGERY SERVICES

## (undated) DEVICE — PACK LAP CHOLE OR - (2EA/CA)

## (undated) DEVICE — BAG RETRIEVAL 5MM (10EA/BX)

## (undated) DEVICE — GLOVE SZ 8 BIOGEL PI MICRO - PF LF (50PR/BX)

## (undated) DEVICE — SUTURE GENERAL

## (undated) DEVICE — OBTURATOR BLADELESS STANDARD 8MM (6EA/BX)

## (undated) DEVICE — CHLORAPREP 26 ML APPLICATOR - ORANGE TINT(25/CA)

## (undated) DEVICE — GLOVE SZ 7.5 BIOGEL PI MICRO - PF LF (50PR/BX)

## (undated) DEVICE — DRAPE ARM  BOX OF 20

## (undated) DEVICE — FORCEPS MARYLAND BIPOLAR (14UN/EA)

## (undated) DEVICE — GOWN WARMING STANDARD FLEX - (30/CA)

## (undated) DEVICE — SYSTEM CLEARIFY VISUALIZATION (10EA/PK)

## (undated) DEVICE — COVER TIP ENDOWRIST HOT SHEAR - (10EA/BX) DA VINCI

## (undated) DEVICE — SET LEADWIRE 5 LEAD BEDSIDE DISPOSABLE ECG (1SET OF 5/EA)

## (undated) DEVICE — COVER MAYO STAND X-LG - (22EA/CA)

## (undated) DEVICE — DRAPE IOBAN II INCISE 23X17 - (10EA/BX 4BX/CA)

## (undated) DEVICE — SENSOR OXIMETER ADULT SPO2 RD SET (20EA/BX)

## (undated) DEVICE — CLIP HEMOLOCK PURPLE - (14/BX)

## (undated) DEVICE — TOWEL STOP TIMEOUT SAFETY FLAG (40EA/CA)

## (undated) DEVICE — SET EXTENSION WITH 2 PORTS (48EA/CA) ***PART #2C8610 IS A SUBSTITUTE*****

## (undated) DEVICE — LACTATED RINGERS INJ 1000 ML - (14EA/CA 60CA/PF)

## (undated) DEVICE — ELECTRODE DUAL RETURN W/ CORD - (50/PK)

## (undated) DEVICE — CLIP APPLIER LARGE DA VINCI 100X'S REUSABLE

## (undated) DEVICE — CANISTER SUCTION 3000ML MECHANICAL FILTER AUTO SHUTOFF MEDI-VAC NONSTERILE LF DISP  (40EA/CA)

## (undated) DEVICE — DRAPE COLUMN  BOX OF 20

## (undated) DEVICE — CANNULA W/SEAL 5X100 Z-THRE - ADED KII (12/BX)